# Patient Record
Sex: MALE | Race: WHITE | NOT HISPANIC OR LATINO | URBAN - METROPOLITAN AREA
[De-identification: names, ages, dates, MRNs, and addresses within clinical notes are randomized per-mention and may not be internally consistent; named-entity substitution may affect disease eponyms.]

---

## 2023-01-31 ENCOUNTER — INPATIENT (INPATIENT)
Facility: HOSPITAL | Age: 68
LOS: 3 days | Discharge: HOME | DRG: 125 | End: 2023-02-04
Attending: PSYCHIATRY & NEUROLOGY | Admitting: PSYCHIATRY & NEUROLOGY
Payer: MEDICAID

## 2023-01-31 VITALS
DIASTOLIC BLOOD PRESSURE: 74 MMHG | SYSTOLIC BLOOD PRESSURE: 109 MMHG | WEIGHT: 111.99 LBS | RESPIRATION RATE: 16 BRPM | TEMPERATURE: 98 F | OXYGEN SATURATION: 100 % | HEART RATE: 99 BPM | HEIGHT: 61.42 IN

## 2023-01-31 LAB
ALBUMIN SERPL ELPH-MCNC: 3.8 G/DL — SIGNIFICANT CHANGE UP (ref 3.3–5)
ALP SERPL-CCNC: 60 U/L — SIGNIFICANT CHANGE UP (ref 40–120)
ALT FLD-CCNC: 30 U/L — SIGNIFICANT CHANGE UP (ref 10–45)
ANION GAP SERPL CALC-SCNC: 11 MMOL/L — SIGNIFICANT CHANGE UP (ref 5–17)
APTT BLD: 29.6 SEC — SIGNIFICANT CHANGE UP (ref 27.5–35.5)
AST SERPL-CCNC: 25 U/L — SIGNIFICANT CHANGE UP (ref 10–40)
BASOPHILS # BLD AUTO: 0.08 K/UL — SIGNIFICANT CHANGE UP (ref 0–0.2)
BASOPHILS NFR BLD AUTO: 1 % — SIGNIFICANT CHANGE UP (ref 0–2)
BILIRUB SERPL-MCNC: 0.5 MG/DL — SIGNIFICANT CHANGE UP (ref 0.2–1.2)
BUN SERPL-MCNC: 18 MG/DL — SIGNIFICANT CHANGE UP (ref 7–23)
CALCIUM SERPL-MCNC: 9.6 MG/DL — SIGNIFICANT CHANGE UP (ref 8.4–10.5)
CHLORIDE SERPL-SCNC: 100 MMOL/L — SIGNIFICANT CHANGE UP (ref 96–108)
CO2 SERPL-SCNC: 25 MMOL/L — SIGNIFICANT CHANGE UP (ref 22–31)
CREAT SERPL-MCNC: 1.18 MG/DL — SIGNIFICANT CHANGE UP (ref 0.5–1.3)
EGFR: 68 ML/MIN/1.73M2 — SIGNIFICANT CHANGE UP
EOSINOPHIL # BLD AUTO: 0.19 K/UL — SIGNIFICANT CHANGE UP (ref 0–0.5)
EOSINOPHIL NFR BLD AUTO: 2.5 % — SIGNIFICANT CHANGE UP (ref 0–6)
GLUCOSE SERPL-MCNC: 90 MG/DL — SIGNIFICANT CHANGE UP (ref 70–99)
HCT VFR BLD CALC: 35.1 % — LOW (ref 39–50)
HGB BLD-MCNC: 11.4 G/DL — LOW (ref 13–17)
IMM GRANULOCYTES NFR BLD AUTO: 0.6 % — SIGNIFICANT CHANGE UP (ref 0–0.9)
INR BLD: 1.07 — SIGNIFICANT CHANGE UP (ref 0.88–1.16)
LYMPHOCYTES # BLD AUTO: 1 K/UL — SIGNIFICANT CHANGE UP (ref 1–3.3)
LYMPHOCYTES # BLD AUTO: 13 % — SIGNIFICANT CHANGE UP (ref 13–44)
MCHC RBC-ENTMCNC: 32.1 PG — SIGNIFICANT CHANGE UP (ref 27–34)
MCHC RBC-ENTMCNC: 32.5 GM/DL — SIGNIFICANT CHANGE UP (ref 32–36)
MCV RBC AUTO: 98.9 FL — SIGNIFICANT CHANGE UP (ref 80–100)
MONOCYTES # BLD AUTO: 0.7 K/UL — SIGNIFICANT CHANGE UP (ref 0–0.9)
MONOCYTES NFR BLD AUTO: 9.1 % — SIGNIFICANT CHANGE UP (ref 2–14)
NEUTROPHILS # BLD AUTO: 5.69 K/UL — SIGNIFICANT CHANGE UP (ref 1.8–7.4)
NEUTROPHILS NFR BLD AUTO: 73.8 % — SIGNIFICANT CHANGE UP (ref 43–77)
NRBC # BLD: 0 /100 WBCS — SIGNIFICANT CHANGE UP (ref 0–0)
PLATELET # BLD AUTO: 264 K/UL — SIGNIFICANT CHANGE UP (ref 150–400)
POTASSIUM SERPL-MCNC: 4.4 MMOL/L — SIGNIFICANT CHANGE UP (ref 3.5–5.3)
POTASSIUM SERPL-SCNC: 4.4 MMOL/L — SIGNIFICANT CHANGE UP (ref 3.5–5.3)
PROT SERPL-MCNC: 6.5 G/DL — SIGNIFICANT CHANGE UP (ref 6–8.3)
PROTHROM AB SERPL-ACNC: 12.8 SEC — SIGNIFICANT CHANGE UP (ref 10.5–13.4)
RBC # BLD: 3.55 M/UL — LOW (ref 4.2–5.8)
RBC # FLD: 12.9 % — SIGNIFICANT CHANGE UP (ref 10.3–14.5)
SARS-COV-2 RNA SPEC QL NAA+PROBE: SIGNIFICANT CHANGE UP
SODIUM SERPL-SCNC: 136 MMOL/L — SIGNIFICANT CHANGE UP (ref 135–145)
TROPONIN T SERPL-MCNC: <0.01 NG/ML — SIGNIFICANT CHANGE UP (ref 0–0.01)
WBC # BLD: 7.71 K/UL — SIGNIFICANT CHANGE UP (ref 3.8–10.5)
WBC # FLD AUTO: 7.71 K/UL — SIGNIFICANT CHANGE UP (ref 3.8–10.5)

## 2023-01-31 PROCEDURE — 0042T: CPT | Mod: MA

## 2023-01-31 PROCEDURE — 71045 X-RAY EXAM CHEST 1 VIEW: CPT | Mod: 26

## 2023-01-31 PROCEDURE — 70498 CT ANGIOGRAPHY NECK: CPT | Mod: 26,MA

## 2023-01-31 PROCEDURE — 70496 CT ANGIOGRAPHY HEAD: CPT | Mod: 26,MA

## 2023-01-31 PROCEDURE — 99291 CRITICAL CARE FIRST HOUR: CPT

## 2023-01-31 RX ORDER — CLOPIDOGREL BISULFATE 75 MG/1
75 TABLET, FILM COATED ORAL DAILY
Refills: 0 | Status: DISCONTINUED | OUTPATIENT
Start: 2023-02-01 | End: 2023-02-03

## 2023-01-31 RX ORDER — CLOPIDOGREL BISULFATE 75 MG/1
300 TABLET, FILM COATED ORAL ONCE
Refills: 0 | Status: COMPLETED | OUTPATIENT
Start: 2023-01-31 | End: 2023-01-31

## 2023-01-31 RX ORDER — ENOXAPARIN SODIUM 100 MG/ML
40 INJECTION SUBCUTANEOUS EVERY 24 HOURS
Refills: 0 | Status: DISCONTINUED | OUTPATIENT
Start: 2023-01-31 | End: 2023-02-04

## 2023-01-31 RX ORDER — ASPIRIN/CALCIUM CARB/MAGNESIUM 324 MG
325 TABLET ORAL ONCE
Refills: 0 | Status: COMPLETED | OUTPATIENT
Start: 2023-01-31 | End: 2023-01-31

## 2023-01-31 RX ORDER — ATORVASTATIN CALCIUM 80 MG/1
80 TABLET, FILM COATED ORAL AT BEDTIME
Refills: 0 | Status: DISCONTINUED | OUTPATIENT
Start: 2023-01-31 | End: 2023-02-03

## 2023-01-31 RX ORDER — ASPIRIN/CALCIUM CARB/MAGNESIUM 324 MG
81 TABLET ORAL DAILY
Refills: 0 | Status: DISCONTINUED | OUTPATIENT
Start: 2023-02-01 | End: 2023-02-03

## 2023-01-31 RX ADMIN — Medication 325 MILLIGRAM(S): at 20:06

## 2023-01-31 RX ADMIN — ATORVASTATIN CALCIUM 80 MILLIGRAM(S): 80 TABLET, FILM COATED ORAL at 21:40

## 2023-01-31 RX ADMIN — CLOPIDOGREL BISULFATE 300 MILLIGRAM(S): 75 TABLET, FILM COATED ORAL at 20:06

## 2023-01-31 RX ADMIN — ENOXAPARIN SODIUM 40 MILLIGRAM(S): 100 INJECTION SUBCUTANEOUS at 21:40

## 2023-01-31 NOTE — ED PROVIDER NOTE - PHYSICAL EXAMINATION
CONST: nontoxic NAD speaking in full sentences  HEAD: atraumatic  EYES: conjunctivae clear  ENT: +trach to air w/ thick discharge, mmm  NECK: supple/FROM  CARD: rrr no murmurs  CHEST: ctab no r/r/w  ABD: soft, nd, nttp, no rebound/guarding  EXT: FROM, symmetric distal pulses intact  SKIN: warm, dry, no rash, no pedal edema/ttp/rash, cap refill <2sec  NEURO: a+ox3, complete OD vision loss, otherwise CN II-XII intact, neg pronator, 5/5 strength x4, gross sensation intact x4

## 2023-01-31 NOTE — H&P ADULT - NSHPLABSRESULTS_GEN_ALL_CORE
Fingerstick Blood Glucose: CAPILLARY BLOOD GLUCOSE  84 (31 Jan 2023 18:53)      POCT Blood Glucose.: 84 mg/dL (31 Jan 2023 18:32)    LABS:                        11.4   7.71  )-----------( 264      ( 31 Jan 2023 19:25 )             35.1     01-31    136  |  100  |  18  ----------------------------<  90  4.4   |  25  |  1.18    Ca    9.6      31 Jan 2023 19:25    TPro  6.5  /  Alb  3.8  /  TBili  0.5  /  DBili  x   /  AST  25  /  ALT  30  /  AlkPhos  60  01-31    PT/INR - ( 31 Jan 2023 19:25 )   PT: 12.8 sec;   INR: 1.07          PTT - ( 31 Jan 2023 19:25 )  PTT:29.6 sec  CARDIAC MARKERS ( 31 Jan 2023 19:25 )  x     / <0.01 ng/mL / x     / x     / x              RADIOLOGY & ADDITIONAL STUDIES:    < from: CT Brain Stroke Protocol (01.31.23 @ 19:07) >    Impression: Minimal microvascular disease. No acute intracranial injury.      < from: CT Brain Perfusion Maps Stroke (01.31.23 @ 19:08) >    IMPRESSION:  No evidence of CT perfusion deficit.      < from: CT Angio Brain Stroke Protocol  w/ IV Cont (01.31.23 @ 19:09) >    Impression: Normal CTA of the intracranial circulation.       < from: CT Angio Neck Stroke Protocol w/ IV Cont (01.31.23 @ 19:11) >    Impression: Normal CTA of the extracranial circulation. No evidence of   carotid stenosis.

## 2023-01-31 NOTE — ED ADULT TRIAGE NOTE - CHIEF COMPLAINT QUOTE
as per PT "I felt dizzy at 1am, passed out and cannot see out of my right eye." tracheostomy to air. denies SOB and CP. as per PT "I felt dizzy at 1am, passed out and cannot see out of my right eye since." tracheostomy to air. denies SOB and CP.

## 2023-01-31 NOTE — ED PROVIDER NOTE - WR INTERPRETATION 1
+trach. +mild increased bl interstitial opacities. no acute focal consolidation vs ptx vs pulm edema.

## 2023-01-31 NOTE — H&P ADULT - ASSESSMENT
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 Ocular US done in ED, OU: no lens displacement, no pvd, no vitreous hemorrhage, no retinal detachment. SBP 130s. Initial imaging unremarkable. Loaded with DAPT. NIHSS 3. OOW for  tenecteplase. No LVO on CTA. Optho consulted in ED for CRAO. Admitted for further stroke workup.     Neuro  #CVA workup  - s/p dapt load  - continue aspirin 81mg and plavix 75mg daily  - continue atorvastatin 80mg daily  - q4hr stroke neuro checks and vitals  - obtain MRI Brain without contrast  - Stroke Code HCT Results: neg  - Stroke Code CTA Results: neg  - Stroke education  - f/u optho consult (ED provider called and left message)    Cards  #HTN  - permissive hypertension, Goal -180  - hold home blood pressure medication for now  - obtain TTE with bubble  - Stroke Code EKG Results:   · EKG Date/Time: 31-Jan-2023 18:34  · Rate: 92  · Axis: Normal  · GA: 140  · QRS: 74  · QTC: 437  · ST/T Wave: no st/t changes    #HLD  - high dose statin as above in CVA  - LDL results: pending    Pulm  - call provider if SPO2 < 94%    GI  #Nutrition/Fluids/Electrolytes   - replete K<4 and Mg <2  - Diet: reg once passed dysphagia  - IVF: none indicated at this time    Renal  - trend BMP    Infectious Disease  - Stroke Code CXR results:  No evidence of acute cardiopulmonary disease. Incidentally seen is a small opacification with some retraction in the right upper lobe, likely mucoid impaction. Further evaluation on a nonemergent basis with chest CT may be pursued if clinically warranted.    Endocrine  - A1C results: pending  - TSH results: pending    DVT Prophylaxis  - lovenox sq for DVT prophylaxis   - SCDs for DVT prophylaxis       Dispo: admit to stroke tele     Discussed daily hospital plans and goals with patient at bedside.    Discussed with stroke fellow Dr. Mejias who discussed with neurology attending Dr. Gusman

## 2023-01-31 NOTE — ED PROVIDER NOTE - PROGRESS NOTE DETAILS
ED ocular us: OU: no lens displacement, no pvd, no vitreous hemorrhage, no retinal detachment. stroke reccs for [id number], ophtho consult, admission to stroke/tele under dr goodwin. stroke reccs for [id number], ophtho consult, admission to stroke/tele under dr goodwin. admitting team to fu on pending labs.

## 2023-01-31 NOTE — ED PROVIDER NOTE - OBJECTIVE STATEMENT
66M Ukrainian-speaking, former smoker (42py), throat ca s/p resection/trach to air (2017, [place]), htn not on Rx, has not seen pcp/onc >6y, now c/o acute vertigo-like sx that woke him from sleep last night lasting x30min that then recurred this AM at 1:30a but also w/ complete OD vision loss. no fever/chills, no ha, no neck pain/stiffness, no eye pain/discharge, no photophobia, no prior eye surgery, no uri/cough, no cp/sob, no abd pain/n/v, no diarrhea, no rash, no trauma, no etoh-dpt/ivdu, no phx acs/mi vs dvt/pe vs tia/cva, no antiplatelet/AC.

## 2023-01-31 NOTE — H&P ADULT - HISTORY OF PRESENT ILLNESS
**STROKE HPI***    HPI: 67y Estonian speaking male, former heavy tobacco smoker (2PPD, 42py), 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 Denies any other focal neurological sx. Ocular US done in ED, OU: no lens displacement, no pvd, no vitreous hemorrhage, no retinal detachment. SBP 130s. Initial imaging unremarkable. Loaded with DAPT.     PAST MEDICAL & SURGICAL HISTORY:      FAMILY HISTORY:      T(C): 36.6 (01-31-23 @ 19:45), Max: 36.6 (01-31-23 @ 19:45)  HR: 84 (01-31-23 @ 19:45) (76 - 99)  BP: 132/64 (01-31-23 @ 19:45) (109/74 - 133/74)  RR: 16 (01-31-23 @ 19:45) (16 - 20)  SpO2: 100% (01-31-23 @ 19:45) (100% - 100%)    MEDICATION RECONCILIATION   MEDICATIONS  (STANDING):  atorvastatin 80 milliGRAM(s) Oral at bedtime  enoxaparin Injectable 40 milliGRAM(s) SubCutaneous every 24 hours    MEDICATIONS  (PRN):    Allergies    No Known Allergies    Intolerances      Vital Signs Last 24 Hrs  T(C): 36.6 (31 Jan 2023 19:45), Max: 36.6 (31 Jan 2023 19:45)  T(F): 97.9 (31 Jan 2023 19:45), Max: 97.9 (31 Jan 2023 19:45)  HR: 84 (31 Jan 2023 19:45) (76 - 99)  BP: 132/64 (31 Jan 2023 19:45) (109/74 - 133/74)  BP(mean): --  RR: 16 (31 Jan 2023 19:45) (16 - 20)  SpO2: 100% (31 Jan 2023 19:45) (100% - 100%)    Parameters below as of 31 Jan 2023 19:45  Patient On (Oxygen Delivery Method): room air        **STROKE HPI***     ID # [id number]     HPI: 67y Japanese speaking male, former heavy tobacco smoker (2PPD, 42py), 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 Denies any other focal neurological sx. Ocular US done in ED, OU: no lens displacement, no pvd, no vitreous hemorrhage, no retinal detachment. SBP 130s. Initial imaging unremarkable. Loaded with DAPT.     PAST MEDICAL & SURGICAL HISTORY:      FAMILY HISTORY:      T(C): 36.6 (01-31-23 @ 19:45), Max: 36.6 (01-31-23 @ 19:45)  HR: 84 (01-31-23 @ 19:45) (76 - 99)  BP: 132/64 (01-31-23 @ 19:45) (109/74 - 133/74)  RR: 16 (01-31-23 @ 19:45) (16 - 20)  SpO2: 100% (01-31-23 @ 19:45) (100% - 100%)    MEDICATION RECONCILIATION   MEDICATIONS  (STANDING):  atorvastatin 80 milliGRAM(s) Oral at bedtime  enoxaparin Injectable 40 milliGRAM(s) SubCutaneous every 24 hours    MEDICATIONS  (PRN):    Allergies    No Known Allergies    Intolerances      Vital Signs Last 24 Hrs  T(C): 36.6 (31 Jan 2023 19:45), Max: 36.6 (31 Jan 2023 19:45)  T(F): 97.9 (31 Jan 2023 19:45), Max: 97.9 (31 Jan 2023 19:45)  HR: 84 (31 Jan 2023 19:45) (76 - 99)  BP: 132/64 (31 Jan 2023 19:45) (109/74 - 133/74)  BP(mean): --  RR: 16 (31 Jan 2023 19:45) (16 - 20)  SpO2: 100% (31 Jan 2023 19:45) (100% - 100%)    Parameters below as of 31 Jan 2023 19:45  Patient On (Oxygen Delivery Method): room air

## 2023-01-31 NOTE — ED ADULT NURSE NOTE - OBJECTIVE STATEMENT
67 M/ on trach to air, bibems c/o as per he passed out this morning and woke up with vision changes and last normal at 1am . Stroke code activated .

## 2023-01-31 NOTE — H&P ADULT - NSHPPHYSICALEXAM_GEN_ALL_CORE
Neurologic:  -Mental status: Awake, alert, oriented to person, age, and month. Speech is fluent with intact naming, repetition, and comprehension, slight dysarthria though speaking with trach in place. Follows commands.   -Cranial nerves:   II: No vision in right eye. Left eye full to confrontation of all visual fields  III, IV, VI: Extraocular movements are intact without nystagmus. Pupils equally round and reactive to light  V:  Facial sensation V1-V3 equal and intact   VII: Face appears symmetric  Motor: Normal bulk and tone. No pronator drift. Strength bilateral upper extremity 5/5 with questionable right finger curling/pulling on oxygen line, bilateral lower extremities 5/5.  Sensation: Intact to light touch bilaterally. No neglect or extinction on double simultaneous testing.  Coordination: No dysmetria on finger-to-nose    NIHSS: 3  ASPECT: 10

## 2023-01-31 NOTE — ED ADULT NURSE NOTE - CHIEF COMPLAINT QUOTE
as per PT "I felt dizzy at 1am, passed out and cannot see out of my right eye since." tracheostomy to air. denies SOB and CP.

## 2023-01-31 NOTE — ED PROVIDER NOTE - CLINICAL SUMMARY MEDICAL DECISION MAKING FREE TEXT BOX
OB 66M Kyrgyz-speaking, former smoker (42py), throat ca s/p resection/trach to air (2017, [place]), htn not on Rx, has not seen pcp/onc >6y, now c/o acute vertigo-like sx that woke him from sleep last night lasting x30min that then recurred this AM at 1:30a but also w/ complete OD vision loss. STROKE CODE activated by provider as within time frame with concern for posterior stroke. Neurology called.

## 2023-02-01 LAB
A1C WITH ESTIMATED AVERAGE GLUCOSE RESULT: 5 % — SIGNIFICANT CHANGE UP (ref 4–5.6)
ANION GAP SERPL CALC-SCNC: 10 MMOL/L — SIGNIFICANT CHANGE UP (ref 5–17)
BUN SERPL-MCNC: 22 MG/DL — SIGNIFICANT CHANGE UP (ref 7–23)
CALCIUM SERPL-MCNC: 9.2 MG/DL — SIGNIFICANT CHANGE UP (ref 8.4–10.5)
CHLORIDE SERPL-SCNC: 103 MMOL/L — SIGNIFICANT CHANGE UP (ref 96–108)
CHOLEST SERPL-MCNC: 137 MG/DL — SIGNIFICANT CHANGE UP
CO2 SERPL-SCNC: 25 MMOL/L — SIGNIFICANT CHANGE UP (ref 22–31)
CREAT SERPL-MCNC: 1.39 MG/DL — HIGH (ref 0.5–1.3)
EGFR: 56 ML/MIN/1.73M2 — LOW
ERYTHROCYTE [SEDIMENTATION RATE] IN BLOOD: 10 MM/HR — SIGNIFICANT CHANGE UP
ESTIMATED AVERAGE GLUCOSE: 97 MG/DL — SIGNIFICANT CHANGE UP (ref 68–114)
GLUCOSE SERPL-MCNC: 94 MG/DL — SIGNIFICANT CHANGE UP (ref 70–99)
HCT VFR BLD CALC: 33.1 % — LOW (ref 39–50)
HCV AB S/CO SERPL IA: 0.04 S/CO — SIGNIFICANT CHANGE UP
HCV AB SERPL-IMP: SIGNIFICANT CHANGE UP
HDLC SERPL-MCNC: 42 MG/DL — SIGNIFICANT CHANGE UP
HGB BLD-MCNC: 10.8 G/DL — LOW (ref 13–17)
LIPID PNL WITH DIRECT LDL SERPL: 77 MG/DL — SIGNIFICANT CHANGE UP
MAGNESIUM SERPL-MCNC: 1.8 MG/DL — SIGNIFICANT CHANGE UP (ref 1.6–2.6)
MCHC RBC-ENTMCNC: 32 PG — SIGNIFICANT CHANGE UP (ref 27–34)
MCHC RBC-ENTMCNC: 32.6 GM/DL — SIGNIFICANT CHANGE UP (ref 32–36)
MCV RBC AUTO: 97.9 FL — SIGNIFICANT CHANGE UP (ref 80–100)
NON HDL CHOLESTEROL: 95 MG/DL — SIGNIFICANT CHANGE UP
NRBC # BLD: 0 /100 WBCS — SIGNIFICANT CHANGE UP (ref 0–0)
PHOSPHATE SERPL-MCNC: 5.7 MG/DL — HIGH (ref 2.5–4.5)
PLATELET # BLD AUTO: 240 K/UL — SIGNIFICANT CHANGE UP (ref 150–400)
POTASSIUM SERPL-MCNC: 4 MMOL/L — SIGNIFICANT CHANGE UP (ref 3.5–5.3)
POTASSIUM SERPL-SCNC: 4 MMOL/L — SIGNIFICANT CHANGE UP (ref 3.5–5.3)
RBC # BLD: 3.38 M/UL — LOW (ref 4.2–5.8)
RBC # FLD: 12.9 % — SIGNIFICANT CHANGE UP (ref 10.3–14.5)
SODIUM SERPL-SCNC: 138 MMOL/L — SIGNIFICANT CHANGE UP (ref 135–145)
TRIGL SERPL-MCNC: 91 MG/DL — SIGNIFICANT CHANGE UP
TSH SERPL-MCNC: 3.97 UIU/ML — SIGNIFICANT CHANGE UP (ref 0.27–4.2)
WBC # BLD: 6.45 K/UL — SIGNIFICANT CHANGE UP (ref 3.8–10.5)
WBC # FLD AUTO: 6.45 K/UL — SIGNIFICANT CHANGE UP (ref 3.8–10.5)

## 2023-02-01 PROCEDURE — 70543 MRI ORBT/FAC/NCK W/O &W/DYE: CPT | Mod: 26

## 2023-02-01 PROCEDURE — 93306 TTE W/DOPPLER COMPLETE: CPT | Mod: 26

## 2023-02-01 RX ORDER — INFLUENZA VIRUS VACCINE 15; 15; 15; 15 UG/.5ML; UG/.5ML; UG/.5ML; UG/.5ML
0.7 SUSPENSION INTRAMUSCULAR ONCE
Refills: 0 | Status: DISCONTINUED | OUTPATIENT
Start: 2023-02-01 | End: 2023-02-04

## 2023-02-01 RX ADMIN — ENOXAPARIN SODIUM 40 MILLIGRAM(S): 100 INJECTION SUBCUTANEOUS at 23:30

## 2023-02-01 RX ADMIN — ATORVASTATIN CALCIUM 80 MILLIGRAM(S): 80 TABLET, FILM COATED ORAL at 23:30

## 2023-02-01 RX ADMIN — CLOPIDOGREL BISULFATE 75 MILLIGRAM(S): 75 TABLET, FILM COATED ORAL at 12:28

## 2023-02-01 RX ADMIN — Medication 81 MILLIGRAM(S): at 12:28

## 2023-02-01 NOTE — PHYSICAL THERAPY INITIAL EVALUATION ADULT - IMPAIRMENTS CONTRIBUTING TO GAIT DEVIATIONS, PT EVAL
no pt educated on visual compensation strategies such as turning head to scan environment 2/2 impaired R vision

## 2023-02-01 NOTE — PHYSICAL THERAPY INITIAL EVALUATION ADULT - GENERAL OBSERVATIONS, REHAB EVAL
PT IE completed. Chart reviewed. Pt received seated EOB, NAD, +IV heplock, +trach. LINO Turpin cleared pt for PT. PT IE completed. Chart reviewed. MRS 1. Pt received seated EOB, NAD, +IV heplock, +trach. LINO Turpin cleared pt for PT.

## 2023-02-01 NOTE — PATIENT PROFILE ADULT - FALL HARM RISK - UNIVERSAL INTERVENTIONS
Bed in lowest position, wheels locked, appropriate side rails in place/Call bell, personal items and telephone in reach/Instruct patient to call for assistance before getting out of bed or chair/Non-slip footwear when patient is out of bed/[place] to call system/Physically safe environment - no spills, clutter or unnecessary equipment/Purposeful Proactive Rounding/Room/bathroom lighting operational, light cord in reach

## 2023-02-01 NOTE — PHYSICAL THERAPY INITIAL EVALUATION ADULT - ADDITIONAL COMMENTS
Pt reports living in apartment with 3 FOS to enter, with 3 sisters. Reports being independent with daily activities without use of AD.

## 2023-02-01 NOTE — OCCUPATIONAL THERAPY INITIAL EVALUATION ADULT - VISUAL ACUITY
not tested No glasses, throughout session pt reports blurry vision with decrease R vision/not tested

## 2023-02-01 NOTE — OCCUPATIONAL THERAPY INITIAL EVALUATION ADULT - VISUAL ASSESSMENT: VISUAL FIELD CUTS
none Pt p/w R peripheral vision (superior quadrants with difficulties to maintain R eye opening during assessment)/none

## 2023-02-01 NOTE — OCCUPATIONAL THERAPY INITIAL EVALUATION ADULT - DIAGNOSIS, OT EVAL
Pt p/w new onset of L hearing and R vision loss, however presents at baseline (IND) for ADLs and functional mobility.

## 2023-02-01 NOTE — OCCUPATIONAL THERAPY INITIAL EVALUATION ADULT - LIVES WITH, PROFILE
Pt lives with his sisters in apt with 3 floor to enter. Pt at baseline is ind for ADLs and functional mobility. No DME needed./other relative

## 2023-02-01 NOTE — PROGRESS NOTE ADULT - ASSESSMENT
67y Israeli speaking male, former heavy tobacco smoker (2PPD, 42py), throat ca s/p resection/trach to air (2017), ?HTN, no meds, coming in with a couple episodes of intermittent dizziness, worse with head movement and also complete  right eye vision loss since 1:30 morning of 1/31. Ocular US done in ED, OU: no lens displacement, no pvd, no vitreous hemorrhage, no retinal detachment. SBP 130s. Initial imaging unremarkable. Loaded with DAPT. NIHSS 3. OOW for  tenecteplase. No LVO on CTA. Optho consulted in ED for CRAO. Admitted for further stroke workup.     Neuro  #CVA workup  - s/p dapt load  - continue aspirin 81mg and plavix 75mg daily  - continue atorvastatin 80mg daily  - q4hr stroke neuro checks and vitals  - obtain MRI Brain without contrast  - Stroke Code HCT Results: neg  - Stroke Code CTA Results: neg  - Stroke education  - f/u optho consult    Cards  #HTN  - blood pressure 99/77 this morning, will monitor  - permissive hypertension, Goal -180  - hold home blood pressure medication for now  - obtain TTE with bubble  - Stroke Code EKG Results:   · EKG Date/Time: 31-Jan-2023 18:34  · Rate: 92  · Axis: Normal  · IA: 140  · QRS: 74  · QTC: 437  · ST/T Wave: no st/t changes    #HLD  - high dose statin as above in CVA  - LDL results: 77    Pulm  - call provider if SPO2 < 94%    GI  #Nutrition/Fluids/Electrolytes   - replete K<4 and Mg <2  - Diet: reg once passed dysphagia  - IVF: none indicated at this time    Renal  - trend BMP    Infectious Disease  - Stroke Code CXR results:  No evidence of acute cardiopulmonary disease. Incidentally seen is a small opacification with some retraction in the right upper lobe, likely mucoid impaction. Further evaluation on a nonemergent basis with chest CT may be pursued if clinically warranted.    Endocrine  - A1C results: 5.0  - TSH results: 3.970    DVT Prophylaxis  - lovenox sq for DVT prophylaxis   - SCDs for DVT prophylaxis       Dispo: admit to stroke tele     Discussed daily hospital plans and goals with patient at bedside.    Discussed with stroke fellow Dr. Mejias who discussed with neurology attending Dr. Gusman             67y Australian speaking male, former heavy tobacco smoker (2PPD, 42py), throat ca s/p resection/trach to air (2017), ?HTN, no meds, coming in with a couple episodes of intermittent dizziness, worse with head movement and also complete  right eye vision loss since 1:30 morning of 1/31. Ocular US done in ED, OU: no lens displacement, no pvd, no vitreous hemorrhage, no retinal detachment. SBP 130s. Initial imaging unremarkable. Loaded with DAPT. NIHSS 3. OOW for  tenecteplase. No LVO on CTA. Optho consulted in ED for CRAO. Admitted for further stroke workup.     Neuro  #CVA workup  - s/p dapt load  - continue aspirin 81mg and plavix 75mg daily  - continue atorvastatin 80mg daily  - q4hr stroke neuro checks and vitals  - obtain MRI Brain without contrast  - Stroke Code HCT Results: neg  - Stroke Code CTA Results: neg  - Stroke education  - f/u optho consult  - ESR pending    Cards  #HTN  - blood pressure 99/77 this morning, will monitor  - permissive hypertension, Goal -180  - hold home blood pressure medication for now  - obtain TTE with bubble  - Stroke Code EKG Results:   · EKG Date/Time: 31-Jan-2023 18:34  · Rate: 92  · Axis: Normal  · TX: 140  · QRS: 74  · QTC: 437  · ST/T Wave: no st/t changes    #HLD  - high dose statin as above in CVA  - LDL results: 77    Pulm  - call provider if SPO2 < 94%    GI  #Nutrition/Fluids/Electrolytes   - replete K<4 and Mg <2  - Diet: reg once passed dysphagia  - IVF: none indicated at this time    Renal  - trend BMP    Infectious Disease  - Stroke Code CXR results:  No evidence of acute cardiopulmonary disease. Incidentally seen is a small opacification with some retraction in the right upper lobe, likely mucoid impaction. Further evaluation on a nonemergent basis with chest CT may be pursued if clinically warranted.    Endocrine  - A1C results: 5.0  - TSH results: 3.970    DVT Prophylaxis  - lovenox sq for DVT prophylaxis   - SCDs for DVT prophylaxis       Dispo: admit to stroke tele, PT/OT/Social work/Speech eval pending     Discussed daily hospital plans and goals with patient at bedside.    Discussed with stroke fellow Dr. Mejias who discussed with neurology attending Dr. Gusman             67y Salvadorean speaking male, former heavy tobacco smoker (2PPD, 42py), throat ca s/p resection/trach to air (2017), ?HTN, no meds, coming in with a couple episodes of intermittent dizziness, worse with head movement and also complete  right eye vision loss since 1:30 morning of 1/31. Ocular US done in ED, OU: no lens displacement, no pvd, no vitreous hemorrhage, no retinal detachment. SBP 130s. Initial imaging unremarkable. Loaded with DAPT. NIHSS 3. OOW for  tenecteplase. No LVO on CTA. Optho consulted in ED for CRAO. Admitted for further stroke workup.     Neuro  #CVA workup  - s/p dapt load  - continue aspirin 81mg and plavix 75mg daily  - continue atorvastatin 80mg daily  - q4hr stroke neuro checks and vitals  - obtain MRI Brain without contrast  - Stroke Code HCT Results: neg  - Stroke Code CTA Results: neg  - Stroke education  - ESR 10  - Ophthalmology consult: MRI head and orbits with and without contrast; outpatient follow-up with his ophthalmologist or with [facility] Department of Ophthalmology   [facility] [address] [address]      Cards  #HTN  - blood pressure 99/77 this morning, will monitor  - permissive hypertension, Goal -180  - hold home blood pressure medication for now  - TTE with bubble - normal  - Stroke Code EKG Results:   · EKG Date/Time: 31-Jan-2023 18:34  · Rate: 92  · Axis: Normal  · CT: 140  · QRS: 74  · QTC: 437  · ST/T Wave: no st/t changes    #HLD  - high dose statin as above in CVA  - LDL results: 77    Pulm  - call provider if SPO2 < 94%    GI  #Nutrition/Fluids/Electrolytes   - replete K<4 and Mg <2  - Diet: reg once passed dysphagia  - IVF: none indicated at this time    Renal  - trend BMP    Infectious Disease  - Stroke Code CXR results:  No evidence of acute cardiopulmonary disease. Incidentally seen is a small opacification with some retraction in the right upper lobe, likely mucoid impaction. Further evaluation on a nonemergent basis with chest CT may be pursued if clinically warranted.    Endocrine  - A1C results: 5.0  - TSH results: 3.970    DVT Prophylaxis  - lovenox sq for DVT prophylaxis   - SCDs for DVT prophylaxis       Dispo: admit to stroke tele, PT/OT/Social work/Speech eval pending; OT recommends outpatient OT/visual therapy     Discussed daily hospital plans and goals with patient at bedside.    Discussed with stroke fellow Dr. Mejias who discussed with neurology attending Dr. Gusmna             67y Citizen of [place] speaking male, former heavy tobacco smoker (2PPD, 42py), throat ca s/p resection/trach to air (2017), ?HTN, no meds, coming in with a couple episodes of intermittent dizziness, worse with head movement and also complete  right eye vision loss since 1:30 morning of 1/31. Ocular US done in ED, OU: no lens displacement, no pvd, no vitreous hemorrhage, no retinal detachment. SBP 130s. Initial imaging unremarkable. Loaded with DAPT. NIHSS 3. OOW for  tenecteplase. No LVO on CTA. Optho consulted in ED for CRAO. Admitted for further stroke workup.     Neuro  #CVA workup  - s/p dapt load  - continue aspirin 81mg and plavix 75mg daily  - continue atorvastatin 80mg daily  - q4hr stroke neuro checks and vitals  - obtain MRI Brain without contrast  - Stroke Code HCT Results: neg  - Stroke Code CTA Results: neg  - Stroke education  - ESR 10  - Ophthalmology consult: MRI head and orbits with and without contrast; outpatient follow-up with his ophthalmologist or with [facility] Department of Ophthalmology   [facility] [address] [address]      Cards  #HTN  - blood pressure 99/77 this morning, will monitor  - permissive hypertension, Goal -180  - hold home blood pressure medication for now  - TTE with bubble - normal  - Stroke Code EKG Results:   · EKG Date/Time: 31-Jan-2023 18:34  · Rate: 92  · Axis: Normal  · UT: 140  · QRS: 74  · QTC: 437  · ST/T Wave: no st/t changes    #HLD  - high dose statin as above in CVA  - LDL results: 77    Pulm  - call provider if SPO2 < 94%  #Tracheostomy  - throat cancer resection 7 years ago in [facility]; no further details known by patient and hospital has since closed; he will ask sister for more information    GI  #Nutrition/Fluids/Electrolytes   - replete K<4 and Mg <2  - Diet: reg once passed dysphagia  - IVF: none indicated at this time    Renal  - trend BMP    Infectious Disease  - Stroke Code CXR results:  No evidence of acute cardiopulmonary disease. Incidentally seen is a small opacification with some retraction in the right upper lobe, likely mucoid impaction. Further evaluation on a nonemergent basis with chest CT may be pursued if clinically warranted.    Endocrine  - A1C results: 5.0  - TSH results: 3.970    DVT Prophylaxis  - lovenox sq for DVT prophylaxis   - SCDs for DVT prophylaxis       Dispo: admit to stroke tele, PT/OT/Social work/Speech eval pending; OT recommends outpatient OT/visual therapy     Discussed daily hospital plans and goals with patient at bedside.    Discussed with stroke fellow Dr. Mejias who discussed with neurology attending Dr. Gusman

## 2023-02-01 NOTE — PHYSICAL THERAPY INITIAL EVALUATION ADULT - MANUAL MUSCLE TESTING RESULTS, REHAB EVAL
MMT performed: (L)UE and (L)LE 5/5 for all major pivots; (R)shoulder flexion 5/5, (R)elbow flexion 5/5, (R)elbow extension 5/5, (R)wrist extension N/T, (R)wrist flexion N/T; (R)hip flexion 5/5, (R)knee extension 5/5, (R)knee flexion 5/5, (R)ankle DF 5/5, (R)ankle PF 5/5

## 2023-02-01 NOTE — OCCUPATIONAL THERAPY INITIAL EVALUATION ADULT - PERTINENT HX OF CURRENT PROBLEM, REHAB EVAL
67y Irish speaking male, former heavy tobacco smoker (2PPD, 42py), 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 Denies any other focal neurological sx. Ocular US done in ED, OU: no lens displacement, no pvd, no vitreous hemorrhage, no retinal detachment. SBP 130s. Initial imaging unremarkable. Loaded with DAPT.

## 2023-02-01 NOTE — PROGRESS NOTE ADULT - SUBJECTIVE AND OBJECTIVE BOX
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 Denies any other focal neurological sx. Ocular US done in ED, OU: no lens displacement, no pvd, no vitreous hemorrhage, no retinal detachment. SBP 130s. Initial imaging unremarkable. Loaded with DAPT.     Interval Hx: Pt states that 1/31 he lost vision in right eye with episodes of dizziness. 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 Denies eye pain, redness, acute new floaters, or flashes of light.             T(C): 36.6 (01-31-23 @ 19:45), Max: 36.6 (01-31-23 @ 19:45)  HR: 84 (01-31-23 @ 19:45) (76 - 99)  BP: 132/64 (01-31-23 @ 19:45) (109/74 - 133/74)  RR: 16 (01-31-23 @ 19:45) (16 - 20)  SpO2: 100% (01-31-23 @ 19:45) (100% - 100%)    MEDICATION RECONCILIATION   MEDICATIONS  (STANDING):  atorvastatin 80 milliGRAM(s) Oral at bedtime  enoxaparin Injectable 40 milliGRAM(s) SubCutaneous every 24 hours    MEDICATIONS  (PRN):    Allergies    No Known Allergies    Intolerances      Vital Signs Last 24 Hrs  T(C): 36.6 (31 Jan 2023 19:45), Max: 36.6 (31 Jan 2023 19:45)  T(F): 97.9 (31 Jan 2023 19:45), Max: 97.9 (31 Jan 2023 19:45)  HR: 84 (31 Jan 2023 19:45) (76 - 99)  BP: 132/64 (31 Jan 2023 19:45) (109/74 - 133/74)  BP(mean): --  RR: 16 (31 Jan 2023 19:45) (16 - 20)  SpO2: 100% (31 Jan 2023 19:45) (100% - 100%)    Parameters below as of 31 Jan 2023 19:45  Patient On (Oxygen Delivery Method): room air       (31 Jan 2023 20:31)    PAST MEDICAL & SURGICAL HISTORY:    Past Ocular History: CE/IOL OU        MEDICATIONS  (STANDING):  aspirin enteric coated 81 milliGRAM(s) Oral daily  atorvastatin 80 milliGRAM(s) Oral at bedtime  clopidogrel Tablet 75 milliGRAM(s) Oral daily  enoxaparin Injectable 40 milliGRAM(s) SubCutaneous every 24 hours    MEDICATIONS  (PRN):    Allergies & Intolerances:       VITALS: T(C): 36.7 (02-01-23 @ 09:37)  T(F): 98 (02-01-23 @ 09:37), Max: 98 (01-31-23 @ 23:43)  HR: 79 (02-01-23 @ 09:37) (70 - 99)  BP: 99/77 (02-01-23 @ 09:37) (99/77 - 133/74)  RR:  (16 - 20)  SpO2:  (97% - 100%)  Wt(kg): --  General: AAO x 3, appropriate mood and affect    Ophthalmology Exam:  Visual acuity (sc): Hand Motion OD, 20/50 PH 20/25 OS  Pupils: PERRL OU, no APD  Ttono: 14, 11 OU  Extraocular movements (EOMs): Full OU, no pain, no diplopia  CVF Full OS unable OD    Pen Light Exam (PLE)  External: Flat OU  Lids/Lashes/Lacrimal Ducts: Flat OU    Sclera/Conjunctiva: W+Q OU  Cornea: Cl OU  Anterior Chamber: D+F OU    Iris: Flat OU  Lens: PCIOL OU    Fundus Exam: dilated with 1% tropicamide and 2.5% phenylephrine  Approval obtained from primary team for dilation  Patient aware that pupils can remained dilated for at least 4-6 hours  Exam performed with 20D lens    Vitreous: wnl OU  Disc, cup/disc: sharp and pink, 0.4 OU  Macula: wnl OU  Vessels: wnl OU  Periphery: wnl OU    Labs/Imaging:  < from: CT Angio Neck Stroke Protocol w/ IV Cont (01.31.23 @ 19:11) >  Findings: Evaluation of the anterior circulation demonstrates normal   course and caliber of the distal internal carotid arteries. There is a   normal appearance to the bilateral anterior cerebral and middle cerebral   arteries.    Evaluation the posterior circulation demonstrates normal course and   caliber of the bilateral vertebral arteries, vertebrobasilar junction and   basilar artery. The bilateral posterior cerebral arteries are also within   normal limits.    There is no evidence of aneurysm or stenosis .    Status post small left frontal craniotomy.    Impression: Normal CTA of the intracranial circulation. CT angiogram of   the extracranial carotid arteries:    Technique: CT angiogram of the extracranial carotid arteries was   performed was performed utilizing helical slices from the base of the   skull through the mediastinum during bolus injection of intravenous   contrast material. Overlapping reconstructions were obtained to allow for   sagittal and coronal reformatted images. 3-D reconstruction was also   performed.    Findings: Evaluation of the aortic arch and the origin of the great   vessels are within normal limits.    The course and caliber of the bilateral common carotid, internal carotid   and external carotid arteries are within normal limits. There is no   evidence of focal stenosis or dissection. There are mild calcifications   at the bifurcation consistent with mild atherosclerotic disease.    The origin of the vertebral arteries are within normal limits without   evidence of stenosis. The course and caliber of the cervical segments of   the vertebral arteries are normal.    The patient is status post ORIF of the left maxillary sinus. 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    The patient is status post tracheostomy    There is a small opacity seen within the right upper lung (image #768   series 7) that may represent pneumonia. There is a small nodular opacity   seen in the left apex of the lung (image #698 series 7).    Impression: Normal CTA of the extracranial circulation. No evidence of   carotid stenosis.      < end of copied text >  < from: CT Brain Stroke Protocol (01.31.23 @ 19:07) >  Findings: There are no prior studies available for comparison.    There is no evidence of acute intracranial hemorrhage or acute   transcortical infarct. There is a small left frontal craniotomy. There   are scattered foci of low density within the periventricular white matter   consistent with mild microvascular disease. The ventricles are normal in   size and caliber.  There is no evidence of mass-effect or midline shift. There is no   evidence of an intra or extra-axial fluid collection.    Visualized paranasal sinuses and bilateral mastoid air cells are clear.    Impression: Minimal microvascular disease. No acute intracranial injury.    The results of this study were discussed with ANTHONY Del Real from stroke team   at 7:00PM on 1/31/2023 This study was performed on 1/31/2023 at 6:58 PM.    < end of copied text >        Assessment and Recommendations:  67y male w/ pmhx/ochx of Trach and Lung CA consulted for acute vision loss OD    Outpatient follow-up: Patient should follow-up with his/her ophthalmologist or with [facility] Department of Ophthalmology at the address below       [facility] [address] [address]   [facility] DEPARTMENT OF OPHTHALMOLOGY - INITIAL ADULT CONSULT  -----------------------------------------------------------------------------------------------------------------  Altagracia Salvadoryeon Torrez  -----------------------------------------------------------------------------------------------------------------    HPI:     used  HPI: 67y Ugandan speaking male, former heavy tobacco smoker (2PPD, 42py), 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 Denies any other focal neurological sx. Ocular US done in ED, OU: no lens displacement, no pvd, no vitreous hemorrhage, no retinal detachment. SBP 130s. Initial imaging unremarkable. Loaded with DAPT.     Interval Hx: Pt states that 1/31 he lost vision in right eye with episodes of dizziness. 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 Denies eye pain, redness, acute new floaters, or flashes of light.             T(C): 36.6 (01-31-23 @ 19:45), Max: 36.6 (01-31-23 @ 19:45)  HR: 84 (01-31-23 @ 19:45) (76 - 99)  BP: 132/64 (01-31-23 @ 19:45) (109/74 - 133/74)  RR: 16 (01-31-23 @ 19:45) (16 - 20)  SpO2: 100% (01-31-23 @ 19:45) (100% - 100%)    MEDICATION RECONCILIATION   MEDICATIONS  (STANDING):  atorvastatin 80 milliGRAM(s) Oral at bedtime  enoxaparin Injectable 40 milliGRAM(s) SubCutaneous every 24 hours    MEDICATIONS  (PRN):    Allergies    No Known Allergies    Intolerances      Vital Signs Last 24 Hrs  T(C): 36.6 (31 Jan 2023 19:45), Max: 36.6 (31 Jan 2023 19:45)  T(F): 97.9 (31 Jan 2023 19:45), Max: 97.9 (31 Jan 2023 19:45)  HR: 84 (31 Jan 2023 19:45) (76 - 99)  BP: 132/64 (31 Jan 2023 19:45) (109/74 - 133/74)  BP(mean): --  RR: 16 (31 Jan 2023 19:45) (16 - 20)  SpO2: 100% (31 Jan 2023 19:45) (100% - 100%)    Parameters below as of 31 Jan 2023 19:45  Patient On (Oxygen Delivery Method): room air       (31 Jan 2023 20:31)    PAST MEDICAL & SURGICAL HISTORY:    Past Ocular History: CE/IOL OU        MEDICATIONS  (STANDING):  aspirin enteric coated 81 milliGRAM(s) Oral daily  atorvastatin 80 milliGRAM(s) Oral at bedtime  clopidogrel Tablet 75 milliGRAM(s) Oral daily  enoxaparin Injectable 40 milliGRAM(s) SubCutaneous every 24 hours    MEDICATIONS  (PRN):    Allergies & Intolerances:       VITALS: T(C): 36.7 (02-01-23 @ 09:37)  T(F): 98 (02-01-23 @ 09:37), Max: 98 (01-31-23 @ 23:43)  HR: 79 (02-01-23 @ 09:37) (70 - 99)  BP: 99/77 (02-01-23 @ 09:37) (99/77 - 133/74)  RR:  (16 - 20)  SpO2:  (97% - 100%)  Wt(kg): --  General: AAO x 3, appropriate mood and affect    Ophthalmology Exam:  Visual acuity (sc): Hand Motion OD, 20/50 PH 20/25 OS  Pupils: PERRL OU, no APD  Ttono: 14, 11 OU  Extraocular movements (EOMs): Full OU, no pain, no diplopia  CVF Full OS unable OD    Pen Light Exam (PLE)  External: Flat OU  Lids/Lashes/Lacrimal Ducts: Flat OU    Sclera/Conjunctiva: W+Q OU  Cornea: Cl OU  Anterior Chamber: D+F OU    Iris: Flat OU  Lens: PCIOL OU    Fundus Exam: dilated with 1% tropicamide and 2.5% phenylephrine  Approval obtained from primary team for dilation  Patient aware that pupils can remained dilated for at least 4-6 hours  Exam performed with 20D lens    Vitreous: wnl OU  Disc, cup/disc: sharp and pink, 0.4 OU  Macula: wnl OU  Vessels: wnl OU  Periphery: wnl OU    Labs/Imaging:  < from: CT Angio Neck Stroke Protocol w/ IV Cont (01.31.23 @ 19:11) >  Findings: Evaluation of the anterior circulation demonstrates normal   course and caliber of the distal internal carotid arteries. There is a   normal appearance to the bilateral anterior cerebral and middle cerebral   arteries.    Evaluation the posterior circulation demonstrates normal course and   caliber of the bilateral vertebral arteries, vertebrobasilar junction and   basilar artery. The bilateral posterior cerebral arteries are also within   normal limits.    There is no evidence of aneurysm or stenosis .    Status post small left frontal craniotomy.    Impression: Normal CTA of the intracranial circulation. CT angiogram of   the extracranial carotid arteries:    Technique: CT angiogram of the extracranial carotid arteries was   performed was performed utilizing helical slices from the base of the   skull through the mediastinum during bolus injection of intravenous   contrast material. Overlapping reconstructions were obtained to allow for   sagittal and coronal reformatted images. 3-D reconstruction was also   performed.    Findings: Evaluation of the aortic arch and the origin of the great   vessels are within normal limits.    The course and caliber of the bilateral common carotid, internal carotid   and external carotid arteries are within normal limits. There is no   evidence of focal stenosis or dissection. There are mild calcifications   at the bifurcation consistent with mild atherosclerotic disease.    The origin of the vertebral arteries are within normal limits without   evidence of stenosis. The course and caliber of the cervical segments of   the vertebral arteries are normal.    The patient is status post ORIF of the left maxillary sinus. 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    The patient is status post tracheostomy    There is a small opacity seen within the right upper lung (image #768   series 7) that may represent pneumonia. There is a small nodular opacity   seen in the left apex of the lung (image #698 series 7).    Impression: Normal CTA of the extracranial circulation. No evidence of   carotid stenosis.      < end of copied text >  < from: CT Brain Stroke Protocol (01.31.23 @ 19:07) >  Findings: There are no prior studies available for comparison.    There is no evidence of acute intracranial hemorrhage or acute   transcortical infarct. There is a small left frontal craniotomy. There   are scattered foci of low density within the periventricular white matter   consistent with mild microvascular disease. The ventricles are normal in   size and caliber.  There is no evidence of mass-effect or midline shift. There is no   evidence of an intra or extra-axial fluid collection.    Visualized paranasal sinuses and bilateral mastoid air cells are clear.    Impression: Minimal microvascular disease. No acute intracranial injury.    The results of this study were discussed with ANTHONY Del Real from stroke team   at 7:00PM on 1/31/2023 This study was performed on 1/31/2023 at 6:58 PM.    < end of copied text >        Assessment and Recommendations:  67y male w/ pmhx/ochx of Trach and Lung CA consulted for acute vision loss OD          - Stroke work up per neurology, agree with MRI   - can get Head and orbits w/wo contrast  - recommend cardiac echo  - cta head and neck neg    Outpatient follow-up: Patient should follow-up with his/her ophthalmologist or with [facility] Department of Ophthalmology at the address below       [facility] [address] [address]   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 Denies any other focal neurological sx. Ocular US done in ED, OU: no lens displacement, no pvd, no vitreous hemorrhage, no retinal detachment. SBP 130s. Initial imaging unremarkable. Loaded with DAPT.     Interval Hx: Pt states that 1/31 he lost vision in right eye with episodes of dizziness. 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 Denies eye pain, redness, acute new floaters, or flashes of light.             T(C): 36.6 (01-31-23 @ 19:45), Max: 36.6 (01-31-23 @ 19:45)  HR: 84 (01-31-23 @ 19:45) (76 - 99)  BP: 132/64 (01-31-23 @ 19:45) (109/74 - 133/74)  RR: 16 (01-31-23 @ 19:45) (16 - 20)  SpO2: 100% (01-31-23 @ 19:45) (100% - 100%)    MEDICATION RECONCILIATION   MEDICATIONS  (STANDING):  atorvastatin 80 milliGRAM(s) Oral at bedtime  enoxaparin Injectable 40 milliGRAM(s) SubCutaneous every 24 hours    MEDICATIONS  (PRN):    Allergies    No Known Allergies    Intolerances      Vital Signs Last 24 Hrs  T(C): 36.6 (31 Jan 2023 19:45), Max: 36.6 (31 Jan 2023 19:45)  T(F): 97.9 (31 Jan 2023 19:45), Max: 97.9 (31 Jan 2023 19:45)  HR: 84 (31 Jan 2023 19:45) (76 - 99)  BP: 132/64 (31 Jan 2023 19:45) (109/74 - 133/74)  BP(mean): --  RR: 16 (31 Jan 2023 19:45) (16 - 20)  SpO2: 100% (31 Jan 2023 19:45) (100% - 100%)    Parameters below as of 31 Jan 2023 19:45  Patient On (Oxygen Delivery Method): room air       (31 Jan 2023 20:31)    PAST MEDICAL & SURGICAL HISTORY:    Past Ocular History: CE/IOL OU        MEDICATIONS  (STANDING):  aspirin enteric coated 81 milliGRAM(s) Oral daily  atorvastatin 80 milliGRAM(s) Oral at bedtime  clopidogrel Tablet 75 milliGRAM(s) Oral daily  enoxaparin Injectable 40 milliGRAM(s) SubCutaneous every 24 hours    MEDICATIONS  (PRN):    Allergies & Intolerances:       VITALS: T(C): 36.7 (02-01-23 @ 09:37)  T(F): 98 (02-01-23 @ 09:37), Max: 98 (01-31-23 @ 23:43)  HR: 79 (02-01-23 @ 09:37) (70 - 99)  BP: 99/77 (02-01-23 @ 09:37) (99/77 - 133/74)  RR:  (16 - 20)  SpO2:  (97% - 100%)  Wt(kg): --  General: AAO x 3, appropriate mood and affect    Ophthalmology Exam:  Visual acuity (sc): CF OD PH 20/40 OD, 20/50 PH 20/25 OS  Pupils: PERRL OU, no APD  Ttono: 14, 11 OU  Extraocular movements (EOMs): Full OU, no pain, no diplopia  CVF Full OS unable OD    Pen Light Exam (PLE)  External: Flat OU  Lids/Lashes/Lacrimal Ducts: Flat OU    Sclera/Conjunctiva: W+Q OU  Cornea: Cl OU  Anterior Chamber: D+F OU    Iris: Flat OU  Lens: PCIOL OU    Fundus Exam: dilated with 1% tropicamide and 2.5% phenylephrine  Approval obtained from primary team for dilation  Patient aware that pupils can remained dilated for at least 4-6 hours  Exam performed with 20D lens    Vitreous: wnl OU  Disc, cup/disc: sharp and pink, 0.4 OU  Macula: wnl OU, large hypopigmented scar inferior arcade  Vessels: wnl OU  Periphery: lattice OD inferiorly, wnl OS    Labs/Imaging:  < from: CT Angio Neck Stroke Protocol w/ IV Cont (01.31.23 @ 19:11) >  Findings: Evaluation of the anterior circulation demonstrates normal   course and caliber of the distal internal carotid arteries. There is a   normal appearance to the bilateral anterior cerebral and middle cerebral   arteries.    Evaluation the posterior circulation demonstrates normal course and   caliber of the bilateral vertebral arteries, vertebrobasilar junction and   basilar artery. The bilateral posterior cerebral arteries are also within   normal limits.    There is no evidence of aneurysm or stenosis .    Status post small left frontal craniotomy.    Impression: Normal CTA of the intracranial circulation. CT angiogram of   the extracranial carotid arteries:    Technique: CT angiogram of the extracranial carotid arteries was   performed was performed utilizing helical slices from the base of the   skull through the mediastinum during bolus injection of intravenous   contrast material. Overlapping reconstructions were obtained to allow for   sagittal and coronal reformatted images. 3-D reconstruction was also   performed.    Findings: Evaluation of the aortic arch and the origin of the great   vessels are within normal limits.    The course and caliber of the bilateral common carotid, internal carotid   and external carotid arteries are within normal limits. There is no   evidence of focal stenosis or dissection. There are mild calcifications   at the bifurcation consistent with mild atherosclerotic disease.    The origin of the vertebral arteries are within normal limits without   evidence of stenosis. The course and caliber of the cervical segments of   the vertebral arteries are normal.    The patient is status post ORIF of the left maxillary sinus. 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    The patient is status post tracheostomy    There is a small opacity seen within the right upper lung (image #768   series 7) that may represent pneumonia. There is a small nodular opacity   seen in the left apex of the lung (image #698 series 7).    Impression: Normal CTA of the extracranial circulation. No evidence of   carotid stenosis.      < end of copied text >  < from: CT Brain Stroke Protocol (01.31.23 @ 19:07) >  Findings: There are no prior studies available for comparison.    There is no evidence of acute intracranial hemorrhage or acute   transcortical infarct. There is a small left frontal craniotomy. There   are scattered foci of low density within the periventricular white matter   consistent with mild microvascular disease. The ventricles are normal in   size and caliber.  There is no evidence of mass-effect or midline shift. There is no   evidence of an intra or extra-axial fluid collection.    Visualized paranasal sinuses and bilateral mastoid air cells are clear.    Impression: Minimal microvascular disease. No acute intracranial injury.    The results of this study were discussed with ANTHONY Del Real from stroke team   at 7:00PM on 1/31/2023 This study was performed on 1/31/2023 at 6:58 PM.    < end of copied text >        Assessment and Recommendations:  67y male w/ pmhx/ochx of Trach and Lung CA consulted for acute vision loss OD          Cataract OD>>OS  - likely etiology of vision loss, pt states that he woke up and noticed that he couldnt see out of right eye  - macula looks normal, no whitening of the retina to suggest CRAO  - pt does have hypopigmented scar OD   - vision corrects to 20/40 OD with pinhole, likely all from cataract.  - cta head and neck neg  - recommend outpatient f/u with cataract surgery OD then OS. Pt lives in [place], can go to [facility] or can follow up with us.       Outpatient follow-up: Patient should follow-up with his/her ophthalmologist or with [facility] Department of Ophthalmology at the address below       [facility] [address] [address]   [facility] DEPARTMENT OF OPHTHALMOLOGY - INITIAL ADULT CONSULT  -----------------------------------------------------------------------------------------------------------------  Altagracia Salvadoryeon Torrez  -----------------------------------------------------------------------------------------------------------------    HPI:     used  HPI: 67y Luxembourger speaking male, former heavy tobacco smoker (2PPD, 42py), 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 Denies any other focal neurological sx. Ocular US done in ED, OU: no lens displacement, no pvd, no vitreous hemorrhage, no retinal detachment. SBP 130s. Initial imaging unremarkable. Loaded with DAPT.     Interval Hx: Pt states that 1/31 he lost vision in right eye with episodes of dizziness. 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 Denies eye pain, redness, acute new floaters, or flashes of light.             T(C): 36.6 (01-31-23 @ 19:45), Max: 36.6 (01-31-23 @ 19:45)  HR: 84 (01-31-23 @ 19:45) (76 - 99)  BP: 132/64 (01-31-23 @ 19:45) (109/74 - 133/74)  RR: 16 (01-31-23 @ 19:45) (16 - 20)  SpO2: 100% (01-31-23 @ 19:45) (100% - 100%)    MEDICATION RECONCILIATION   MEDICATIONS  (STANDING):  atorvastatin 80 milliGRAM(s) Oral at bedtime  enoxaparin Injectable 40 milliGRAM(s) SubCutaneous every 24 hours    MEDICATIONS  (PRN):    Allergies    No Known Allergies    Intolerances      Vital Signs Last 24 Hrs  T(C): 36.6 (31 Jan 2023 19:45), Max: 36.6 (31 Jan 2023 19:45)  T(F): 97.9 (31 Jan 2023 19:45), Max: 97.9 (31 Jan 2023 19:45)  HR: 84 (31 Jan 2023 19:45) (76 - 99)  BP: 132/64 (31 Jan 2023 19:45) (109/74 - 133/74)  BP(mean): --  RR: 16 (31 Jan 2023 19:45) (16 - 20)  SpO2: 100% (31 Jan 2023 19:45) (100% - 100%)    Parameters below as of 31 Jan 2023 19:45  Patient On (Oxygen Delivery Method): room air       (31 Jan 2023 20:31)    PAST MEDICAL & SURGICAL HISTORY:    Past Ocular History: CE/IOL OU        MEDICATIONS  (STANDING):  aspirin enteric coated 81 milliGRAM(s) Oral daily  atorvastatin 80 milliGRAM(s) Oral at bedtime  clopidogrel Tablet 75 milliGRAM(s) Oral daily  enoxaparin Injectable 40 milliGRAM(s) SubCutaneous every 24 hours    MEDICATIONS  (PRN):    Allergies & Intolerances:       VITALS: T(C): 36.7 (02-01-23 @ 09:37)  T(F): 98 (02-01-23 @ 09:37), Max: 98 (01-31-23 @ 23:43)  HR: 79 (02-01-23 @ 09:37) (70 - 99)  BP: 99/77 (02-01-23 @ 09:37) (99/77 - 133/74)  RR:  (16 - 20)  SpO2:  (97% - 100%)  Wt(kg): --  General: AAO x 3, appropriate mood and affect    Ophthalmology Exam:  Visual acuity (sc): CF OD PH 20/40 OD, 20/50 PH 20/25 OS  Pupils: PERRL OU, no APD  Ttono: 14, 11 OU  Extraocular movements (EOMs): Full OU, no pain, no diplopia  CVF Full OS unable OD    Pen Light Exam (PLE)  External: Flat OU  Lids/Lashes/Lacrimal Ducts: Flat OU    Sclera/Conjunctiva: W+Q OU  Cornea: Cl OU  Anterior Chamber: D+F OU    Iris: Flat OU  Lens: PCIOL OU    Fundus Exam: dilated with 1% tropicamide and 2.5% phenylephrine  Approval obtained from primary team for dilation  Patient aware that pupils can remained dilated for at least 4-6 hours  Exam performed with 20D lens    Vitreous: wnl OU  Disc, cup/disc: sharp and pink, 0.4 OU  Macula: wnl OU, large hypopigmented scar inferior arcade  Vessels: wnl OU  Periphery: lattice OD inferiorly, wnl OS    Labs/Imaging:  < from: CT Angio Neck Stroke Protocol w/ IV Cont (01.31.23 @ 19:11) >  Findings: Evaluation of the anterior circulation demonstrates normal   course and caliber of the distal internal carotid arteries. There is a   normal appearance to the bilateral anterior cerebral and middle cerebral   arteries.    Evaluation the posterior circulation demonstrates normal course and   caliber of the bilateral vertebral arteries, vertebrobasilar junction and   basilar artery. The bilateral posterior cerebral arteries are also within   normal limits.    There is no evidence of aneurysm or stenosis .    Status post small left frontal craniotomy.    Impression: Normal CTA of the intracranial circulation. CT angiogram of   the extracranial carotid arteries:    Technique: CT angiogram of the extracranial carotid arteries was   performed was performed utilizing helical slices from the base of the   skull through the mediastinum during bolus injection of intravenous   contrast material. Overlapping reconstructions were obtained to allow for   sagittal and coronal reformatted images. 3-D reconstruction was also   performed.    Findings: Evaluation of the aortic arch and the origin of the great   vessels are within normal limits.    The course and caliber of the bilateral common carotid, internal carotid   and external carotid arteries are within normal limits. There is no   evidence of focal stenosis or dissection. There are mild calcifications   at the bifurcation consistent with mild atherosclerotic disease.    The origin of the vertebral arteries are within normal limits without   evidence of stenosis. The course and caliber of the cervical segments of   the vertebral arteries are normal.    The patient is status post ORIF of the left maxillary sinus. 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    The patient is status post tracheostomy    There is a small opacity seen within the right upper lung (image #768   series 7) that may represent pneumonia. There is a small nodular opacity   seen in the left apex of the lung (image #698 series 7).    Impression: Normal CTA of the extracranial circulation. No evidence of   carotid stenosis.      < end of copied text >  < from: CT Brain Stroke Protocol (01.31.23 @ 19:07) >  Findings: There are no prior studies available for comparison.    There is no evidence of acute intracranial hemorrhage or acute   transcortical infarct. There is a small left frontal craniotomy. There   are scattered foci of low density within the periventricular white matter   consistent with mild microvascular disease. The ventricles are normal in   size and caliber.  There is no evidence of mass-effect or midline shift. There is no   evidence of an intra or extra-axial fluid collection.    Visualized paranasal sinuses and bilateral mastoid air cells are clear.    Impression: Minimal microvascular disease. No acute intracranial injury.    The results of this study were discussed with ANTHONY Del Real from stroke team   at 7:00PM on 1/31/2023 This study was performed on 1/31/2023 at 6:58 PM.    < end of copied text >        Assessment and Recommendations:  67y male w/ pmhx/ochx of Trach and Lung CA consulted for acute vision loss OD          Cataract OD>>OS  - likely etiology of vision loss, pt states that he woke up and noticed that he couldnt see out of right eye  - macula looks normal, no whitening of the retina to suggest CRAO. Although no evidence of CRAO, agree with MRI H&Orbits.   - pt does have hypopigmented scar OD   - vision corrects to 20/40 OD with pinhole, likely all from cataract.  - cta head and neck neg  - recommend outpatient f/u with cataract surgery OD then OS. Pt lives in [place], can go to [facility] or can follow up with us.       Outpatient follow-up: Patient should follow-up with his/her ophthalmologist or with [facility] Department of Ophthalmology at the address below       [facility] [address] [address]

## 2023-02-01 NOTE — PHYSICAL THERAPY INITIAL EVALUATION ADULT - SENSORY TESTS
R hand dominant; (L) hand  5/5, (R) hand  5/5. CN Testing: B/L Frontalis intact; B/L buccinator intact; smile symmetrical; tongue protrusion at midline; B/L eyes open/close intact; Shoulder elevation: intact bilaterally; Vision H-Test: bilateral tracking and smooth pursuit intact; Convergence/Divergence: intact; Vision Quadrant Test: intact bilaterally, but pt with difficulty R temporal fields, reports blurry vision

## 2023-02-01 NOTE — OCCUPATIONAL THERAPY INITIAL EVALUATION ADULT - MODALITIES TREATMENT COMMENTS
Cranial Nerves II - XII: II: PERRLA; visual fields are full to confrontation III, IV, VI: EOMI, no nystagmus appreciated V: facial sensation intact to light touch V1-V3 b/l VII: no ptosis, no facial droop, symmetric eyebrow raise and closure VIII: DECREASE HEARING ON LEFT SIDE COMPARED TO RIGHT SIDE  b/l  XI: head turning and shoulder shrug intact b/l XII: tongue protrusion midline

## 2023-02-01 NOTE — PROGRESS NOTE ADULT - SUBJECTIVE AND OBJECTIVE BOX
ID# [id number]    Interval History:  Patient seen and examined at bedside. No acute events overnight. Able to eat and sleep comfortably. Reports continued loss of vision in right eye and black spots seen through left eye for 3 days. No eye pain, but has bilateral pruritis. Continues to have dizziness with standing up quickly. No headache or weakness. Reports sensitivity to light in left eye.     Physical Exam:  Neurologic:  -Mental status: Awake and alert. Speech is fluent, slight dysarthria though speaking with trach in place. Follows commands.   -Cranial nerves:   II: Impaired vision in right eye. Left eye full to confrontation of all visual fields  III, IV, VI: Extraocular movements are intact without nystagmus. Pupils equally round and reactive to light  V:  Facial sensation V1-V3 equal and intact   VII: Face appears symmetric  Motor: Normal bulk and tone. No pronator drift. Strength bilateral upper extremity 5/5 with questionable right finger curling/pulling on oxygen line, bilateral lower extremities 5/5.  Sensation: Intact to light touch bilaterally. No neglect or extinction on double simultaneous testing.          ID# [id number]    Interval History:  Patient seen and examined at bedside. No acute events overnight. Able to eat and sleep comfortably. Reports continued loss of vision in right eye and black spots seen through left eye for 3 days. No eye pain, but has bilateral pruritis. Continues to have dizziness with standing up quickly. No headache or weakness. Reports sensitivity to light in left eye.     Physical Exam:  Neurologic:  -Mental status: Awake and alert. Speech is fluent, slight dysarthria though speaking with trach in place. Follows commands.   -Cranial nerves:   II: Impaired vision in right eye. Left eye full to confrontation of all visual fields  III, IV, VI: Extraocular movements are intact without nystagmus. Pupils equally round and reactive to light  V:  Facial sensation V1-V3 equal and intact   VII: Face appears symmetric  Motor: Normal bulk and tone. No pronator drift. Strength bilateral upper extremity 5/5 with questionable right finger curling/pulling on oxygen line, bilateral lower extremities 5/5.  Sensation: Intact to light touch bilaterally. No neglect or extinction on double simultaneous testing.      Vital Signs Last 24 Hrs  T(C): 36.7 (01 Feb 2023 09:37), Max: 36.7 (31 Jan 2023 23:43)  T(F): 98 (01 Feb 2023 09:37), Max: 98 (31 Jan 2023 23:43)  HR: 79 (01 Feb 2023 09:37) (70 - 99)  BP: 99/77 (01 Feb 2023 09:37) (99/77 - 133/74)  BP(mean): --  RR: 18 (01 Feb 2023 09:37) (16 - 20)  SpO2: 97% (01 Feb 2023 09:37) (97% - 100%)    Parameters below as of 01 Feb 2023 09:37  Patient On (Oxygen Delivery Method): room air      ID# [id number]    Interval History:  Patient seen and examined at bedside. No acute events overnight. Able to eat and sleep comfortably. Reports continued loss of vision in right eye and black spots seen through left eye for 3 days. No eye pain, but has bilateral pruritis. Continues to have dizziness with standing up quickly. No headache or weakness. Reports sensitivity to light in left eye.     Physical Exam:  Neurologic:  -Mental status: Awake and alert. Speech is fluent, slight dysarthria though speaking with trach in place. Follows commands.   -Cranial nerves:   II: Impaired vision in right eye. Left eye full to confrontation of all visual fields  III, IV, VI: Extraocular movements are intact without nystagmus. Pupils equally round and reactive to light  V:  Facial sensation V1-V3 equal and intact   VII: Face appears symmetric  Motor: Normal bulk and tone. No pronator drift. Strength bilateral upper extremity 5/5, bilateral lower extremities 5/5.  Sensation: Intact to light touch bilaterally. No neglect or extinction on double simultaneous testing.      Vital Signs Last 24 Hrs  T(C): 36.7 (01 Feb 2023 09:37), Max: 36.7 (31 Jan 2023 23:43)  T(F): 98 (01 Feb 2023 09:37), Max: 98 (31 Jan 2023 23:43)  HR: 79 (01 Feb 2023 09:37) (70 - 99)  BP: 99/77 (01 Feb 2023 09:37) (99/77 - 133/74)  BP(mean): --  RR: 18 (01 Feb 2023 09:37) (16 - 20)  SpO2: 97% (01 Feb 2023 09:37) (97% - 100%)    Parameters below as of 01 Feb 2023 09:37  Patient On (Oxygen Delivery Method): room air

## 2023-02-02 DIAGNOSIS — I95.9 HYPOTENSION, UNSPECIFIED: ICD-10-CM

## 2023-02-02 LAB
ANION GAP SERPL CALC-SCNC: 10 MMOL/L — SIGNIFICANT CHANGE UP (ref 5–17)
BUN SERPL-MCNC: 21 MG/DL — SIGNIFICANT CHANGE UP (ref 7–23)
CALCIUM SERPL-MCNC: 9.7 MG/DL — SIGNIFICANT CHANGE UP (ref 8.4–10.5)
CHLORIDE SERPL-SCNC: 101 MMOL/L — SIGNIFICANT CHANGE UP (ref 96–108)
CO2 SERPL-SCNC: 27 MMOL/L — SIGNIFICANT CHANGE UP (ref 22–31)
CREAT SERPL-MCNC: 1.21 MG/DL — SIGNIFICANT CHANGE UP (ref 0.5–1.3)
EGFR: 66 ML/MIN/1.73M2 — SIGNIFICANT CHANGE UP
GLUCOSE SERPL-MCNC: 98 MG/DL — SIGNIFICANT CHANGE UP (ref 70–99)
HCT VFR BLD CALC: 34 % — LOW (ref 39–50)
HGB BLD-MCNC: 11.1 G/DL — LOW (ref 13–17)
MAGNESIUM SERPL-MCNC: 1.8 MG/DL — SIGNIFICANT CHANGE UP (ref 1.6–2.6)
MCHC RBC-ENTMCNC: 32 PG — SIGNIFICANT CHANGE UP (ref 27–34)
MCHC RBC-ENTMCNC: 32.6 GM/DL — SIGNIFICANT CHANGE UP (ref 32–36)
MCV RBC AUTO: 98 FL — SIGNIFICANT CHANGE UP (ref 80–100)
NRBC # BLD: 0 /100 WBCS — SIGNIFICANT CHANGE UP (ref 0–0)
PHOSPHATE SERPL-MCNC: 3.3 MG/DL — SIGNIFICANT CHANGE UP (ref 2.5–4.5)
PLATELET # BLD AUTO: 246 K/UL — SIGNIFICANT CHANGE UP (ref 150–400)
POTASSIUM SERPL-MCNC: 3.9 MMOL/L — SIGNIFICANT CHANGE UP (ref 3.5–5.3)
POTASSIUM SERPL-SCNC: 3.9 MMOL/L — SIGNIFICANT CHANGE UP (ref 3.5–5.3)
RBC # BLD: 3.47 M/UL — LOW (ref 4.2–5.8)
RBC # FLD: 12.6 % — SIGNIFICANT CHANGE UP (ref 10.3–14.5)
SODIUM SERPL-SCNC: 138 MMOL/L — SIGNIFICANT CHANGE UP (ref 135–145)
WBC # BLD: 6.95 K/UL — SIGNIFICANT CHANGE UP (ref 3.8–10.5)
WBC # FLD AUTO: 6.95 K/UL — SIGNIFICANT CHANGE UP (ref 3.8–10.5)

## 2023-02-02 PROCEDURE — 99254 IP/OBS CNSLTJ NEW/EST MOD 60: CPT

## 2023-02-02 PROCEDURE — 99253 IP/OBS CNSLTJ NEW/EST LOW 45: CPT | Mod: 25

## 2023-02-02 PROCEDURE — 73080 X-RAY EXAM OF ELBOW: CPT | Mod: 26,LT

## 2023-02-02 PROCEDURE — 99233 SBSQ HOSP IP/OBS HIGH 50: CPT

## 2023-02-02 PROCEDURE — 31502 CHANGE OF WINDPIPE AIRWAY: CPT

## 2023-02-02 RX ADMIN — Medication 81 MILLIGRAM(S): at 11:52

## 2023-02-02 RX ADMIN — ATORVASTATIN CALCIUM 80 MILLIGRAM(S): 80 TABLET, FILM COATED ORAL at 21:30

## 2023-02-02 RX ADMIN — CLOPIDOGREL BISULFATE 75 MILLIGRAM(S): 75 TABLET, FILM COATED ORAL at 11:51

## 2023-02-02 RX ADMIN — ENOXAPARIN SODIUM 40 MILLIGRAM(S): 100 INJECTION SUBCUTANEOUS at 21:30

## 2023-02-02 NOTE — DISCHARGE NOTE PROVIDER - NSDCCPCAREPLAN_GEN_ALL_CORE_FT
PRINCIPAL DISCHARGE DIAGNOSIS  Diagnosis: Cataract  Assessment and Plan of Treatment: Maryjane information and education regarding Cataracts provided to patient in [place]. The ophthalmologist (eye doctor) examined both of your eyes and found cataracts in both of your eyes, worsened in the right eye. You are instructued to follow up with ophthalmologists at [facility] whom take patients without insurance for follow up. You will require cataract surgery in both eyes. In order to help your vision, please follow up with an ophthalmologist or optometrist for an updated glasses prescription. Unfortunately, nothing else will help your vision besides removal of the cataract.      SECONDARY DISCHARGE DIAGNOSES  Diagnosis: History of tracheostomy  Assessment and Plan of Treatment: You have a history of throat cancer treated with chemotherapy and radiation and was resected with a trach in place. Your trach is around 7 years old. ENT (throat doctors) were consulted and your trach was replaced with a reusable shiley. You are tolerating your trach with cap well, however, it is important that you follow up with a ENT doctor to establish care for throat cancer workup. ENT at [facility] will be able to provide care to you without insurance.

## 2023-02-02 NOTE — CONSULT NOTE ADULT - ASSESSMENT
-------------------------------  ASSESSMENT/PLAN:    IMPRESSION: LINDEN BAZZI  is a 67y Czech speaking male, former heavy tobacco smoker (2PPD, 42py), 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 Denies any other focal neurological sx. Ocular US done in ED, OU: no lens displacement, no pvd, no vitreous hemorrhage, no retinal detachment. SBP 130s. Initial imaging unremarkable. Loaded with DAPT.  ENT consulted as patient mentioned he has not had a trach change in 7 years.  He states in [place] he underwent chemo/radiaion and a surgical dissection and tracheostomy in 2017 for a form of throat cancer. Patient was unable to provide any additional information.  Tracheostomy was changed to a [id number].     RECOMMENDATIONS:  - Continue nursing education regarding tracheostomy care  - Recommend PMV for talking   - Continue care as per primary team  - Recommend for patient to follow up with an ENT for routine care and follow up   ---  Thank you for the kind referral and for allowing me to share in the care of LINDEN BAZZI If you have any questions, please do not hesitate to contact me.     Sincerely,  Liset Boston PA-C  02-02-23 @ 13:04    
per Internal Medicine    67 y o Nepali speaking male, former heavy tobacco smoker (2PPD, 42py), throat ca s/p resection/trach to air (2017), ?HTN, no meds, coming in with a couple  episodes of intermittent dizziness, worse with head movement and also complete right eye vision loss since 1:30 morning of 1/31. Ocular US done in ED, no lens displacement, no pvd, no vitreous hemorrhage, no retinal detachment.    SBP 130s. Initial imaging unremarkable. Loaded with DAPT. NIHSS 3. OOW fortenecteplase. No LVO on CTA. Optho consulted in ED for CRAO. Admitted for further stroke workup.     Neuro  #CVA workup  - s/p dapt load  - continue aspirin 81mg and plavix 75mg daily  - continue atorvastatin 80mg daily  - q4hr stroke neuro checks and vitals  - obtain MRI Brain without contrast  - Stroke Code HCT Results: neg  - Stroke Code CTA Results: neg  - Stroke education  - ESR 10  - Ophthalmology consult: MRI head and orbits with and without contrast; outpatient follow-up with his ophthalmologist or with [facility] Department of Ophthalmology   [facility] [address] [address]      Cards  #HTN  - blood pressure 99/77 this morning, will monitor  - permissive hypertension, Goal -180  - hold home blood pressure medication for now  - TTE with bubble - normal  - Stroke Code EKG Results:   · EKG Date/Time: 31-Jan-2023 18:34  · Rate: 92  · Axis: Normal  · PA: 140  · QRS: 74  · QTC: 437  · ST/T Wave: no st/t changes    #HLD  - high dose statin as above in CVA  - LDL results: 77    Pulm  - call provider if SPO2 < 94%  #Tracheostomy  - throat cancer resection 7 years ago in [facility]; no further details known by patient and hospital has since closed; he will ask sister for more information    GI  #Nutrition/Fluids/Electrolytes   - replete K<4 and Mg <2  - Diet: reg once passed dysphagia  - IVF: none indicated at this time    Renal  - trend BMP    Infectious Disease  - Stroke Code CXR results:  No evidence of acute cardiopulmonary disease. Incidentally seen is a small opacification with some retraction in the right upper lobe, likely mucoid impaction. Further evaluation on a nonemergent basis with chest CT may be pursued if clinically warranted.    Endocrine  - A1C results: 5.0  - TSH results: 3.970    DVT Prophylaxis  - lovenox sq for DVT prophylaxis   - SCDs for DVT prophylaxis 
67M Luxembourgish speaker, former smoker, h/o throat CA s/p resection w tracheostomy w speaking valve in [facility] (2017), b/l cataracts, p/w dizziness, loss of vision in R eye since 1/31 and red flashes in L eye, admitted for CVA evaluation, also seen by ophthalmology    #CVA R/O - R vision loss, L sided flashes vs chronic floater  A1C 5.7, LDL 77, TSH 3.97   - Ophthalmology also evaluating - Recommending b/l cataract surgery   - MRI orbits wo acute process  TTE - nml LVEF   - on DAPT, atorva  #s/p tracheostomy - 2017 - s/p throat CA resection - uses speaking valve.     Recommend  Overall pt reports dizziness improved today.   Agree w ENT c/s for trach care - now s/p exchange. However pt expresses more difficulty speaking w new trach - admits he has to take a breath more mid sentence. There is increased air leakage on examination and pt's voice is more strained -- would call ENT to re-evaluate    DISPO: Home w Home PT  Follow-up will need to establish primary care.

## 2023-02-02 NOTE — DISCHARGE NOTE PROVIDER - CARE PROVIDER_API CALL
Aidan Baez  [facility]  [address] [place] [id number]  Phone: [phone]  Fax: (   )    -  Follow Up Time: 2 weeks    Janes Gonzalez  [facility]/[place] [address]  Phone: [phone]  Fax: (   )    -  Follow Up Time: 2 weeks   Janes oGnzalez  [facility]/[place] [address]  Phone: [phone]  Fax: (   )    -  Follow Up Time: 2 weeks    Rosalba Gonzalez  [facility]/[place] [address]  Phone: (   )    -  Fax: (   )    -  Follow Up Time: 2 weeks

## 2023-02-02 NOTE — PROGRESS NOTE ADULT - SUBJECTIVE AND OBJECTIVE BOX
Neurology Stroke Progress Note    INTERVAL HPI/OVERNIGHT EVENTS:  MRI obtained overnight, negative for acute infarct, no other Patient seen and examined.  number ______ used.    MEDICATIONS  (STANDING):  aspirin enteric coated 81 milliGRAM(s) Oral daily  atorvastatin 80 milliGRAM(s) Oral at bedtime  clopidogrel Tablet 75 milliGRAM(s) Oral daily  enoxaparin Injectable 40 milliGRAM(s) SubCutaneous every 24 hours  influenza  Vaccine (HIGH DOSE) 0.7 milliLiter(s) IntraMuscular once    MEDICATIONS  (PRN):      Allergies    No Known Allergies    Intolerances        Vital Signs Last 24 Hrs  T(C): 36.5 (02 Feb 2023 09:27), Max: 37 (01 Feb 2023 20:25)  T(F): 97.7 (02 Feb 2023 09:27), Max: 98.6 (01 Feb 2023 20:25)  HR: 77 (02 Feb 2023 08:24) (67 - 89)  BP: 112/63 (02 Feb 2023 08:24) (99/77 - 136/74)  BP(mean): 82 (02 Feb 2023 08:24) (75 - 97)  RR: 18 (02 Feb 2023 08:24) (16 - 18)  SpO2: 99% (02 Feb 2023 08:24) (97% - 99%)    Parameters below as of 02 Feb 2023 08:24  Patient On (Oxygen Delivery Method): room air        Physical exam:  General: No acute distress, awake and alert  Eyes: Anicteric sclerae, moist conjunctivae, see below for CNs  Neck: trachea midline, FROM, supple, no thyromegaly or lymphadenopathy  Cardiovascular: Regular rate and rhythm, no murmurs, rubs, or gallops. No carotid bruits.   Pulmonary: Anterior breath sounds clear bilaterally, no crackles or wheezing. No use of accessory muscles  GI: Abdomen soft, non-distended, non-tender  Extremities: Radial and DP pulses +2, no edema    Neurologic:  -Mental status: Awake, alert, oriented to person, place, and time. Speech is fluent with intact naming, repetition, and comprehension, no dysarthria. Recent and remote memory intact. Follows commands. Attention/concentration intact. Fund of knowledge appropriate.  -Cranial nerves:   II: Visual fields are full to confrontation.  III, IV, VI: Extraocular movements are intact without nystagmus. Pupils equally round and reactive to light  V:  Facial sensation V1-V3 equal and intact   VII: Face is symmetric with normal eye closure and smile  VIII: Hearing is bilaterally intact to finger rub  IX, X: Uvula is midline and soft palate rises symmetrically  XI: Head turning and shoulder shrug are intact.  XII: Tongue protrudes midline  Motor: Normal bulk and tone. No pronator drift. Strength bilateral upper extremity 5/5, bilateral lower extremities 5/5.  Rapid alternating movements intact and symmetric  Sensation: Intact to light touch bilaterally. No neglect or extinction on double simultaneous testing.  Coordination: No dysmetria on finger-to-nose and heel-to-shin bilaterally  Reflexes: Downgoing toes bilaterally   Gait: Narrow gait and steady    LABS:                        11.1   6.95  )-----------( 246      ( 02 Feb 2023 05:52 )             34.0     02-02    138  |  101  |  21  ----------------------------<  98  3.9   |  27  |  1.21    Ca    9.7      02 Feb 2023 05:52  Phos  3.3     02-02  Mg     1.8     02-02    TPro  6.5  /  Alb  3.8  /  TBili  0.5  /  DBili  x   /  AST  25  /  ALT  30  /  AlkPhos  60  01-31    PT/INR - ( 31 Jan 2023 19:25 )   PT: 12.8 sec;   INR: 1.07          PTT - ( 31 Jan 2023 19:25 )  PTT:29.6 sec      RADIOLOGY & ADDITIONAL TESTS:

## 2023-02-02 NOTE — CONSULT NOTE ADULT - SUBJECTIVE AND OBJECTIVE BOX
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 Denies any other focal neurological sx. Ocular US done in ED, OU: no lens displacement, no pvd, no vitreous hemorrhage, no retinal detachment. SBP 130s. Initial imaging unremarkable. Loaded with DAPT. "    67M Swiss speaker, former smoker, h/o throat CA s/p resection w tracheostomy w speaking valve in [facility] (2017), b/l cataracts, p/w dizziness, loss of vision in R eye since 1/31 and red flashes in L eye, admitted for CVA evaluation, also seen by ophthalmology    Pt reports being in usual state of health - does not take any medications or follow w a physician here in the [place]. Reports having vision loss in his R eye and difficulty w vision in L did. Denies any recent head injury, falls, trauma. No fever, chills, sweats, chest pain, N/V/Abd pain, dysuria, diarrhea.  Regarding trach care - pt reports he does not follow w anyone here in the [place]. Admits to cleaning tracheostomy by wiping w a napkin. He denies any fever, increased sputum, dyspnea, chest discomfort. Denies any coughing or difficulty eating/swallowing.    ROS: 12 point ROS reviewed and otherwise negative  FH: No DVT/PE  SH: Non smoker    PAST MEDICAL & SURGICAL HISTORY:      FAMILY HISTORY:      T(C): 36.6 (01-31-23 @ 19:45), Max: 36.6 (01-31-23 @ 19:45)  HR: 84 (01-31-23 @ 19:45) (76 - 99)  BP: 132/64 (01-31-23 @ 19:45) (109/74 - 133/74)  RR: 16 (01-31-23 @ 19:45) (16 - 20)  SpO2: 100% (01-31-23 @ 19:45) (100% - 100%)    MEDICATION RECONCILIATION   MEDICATIONS  (STANDING):  atorvastatin 80 milliGRAM(s) Oral at bedtime  enoxaparin Injectable 40 milliGRAM(s) SubCutaneous every 24 hours    MEDICATIONS  (PRN):    Allergies    No Known Allergies    Intolerances      Vital Signs Last 24 Hrs  T(C): 36.6 (31 Jan 2023 19:45), Max: 36.6 (31 Jan 2023 19:45)  T(F): 97.9 (31 Jan 2023 19:45), Max: 97.9 (31 Jan 2023 19:45)  HR: 84 (31 Jan 2023 19:45) (76 - 99)  BP: 132/64 (31 Jan 2023 19:45) (109/74 - 133/74)  BP(mean): --  RR: 16 (31 Jan 2023 19:45) (16 - 20)  SpO2: 100% (31 Jan 2023 19:45) (100% - 100%)    Parameters below as of 31 Jan 2023 19:45  Patient On (Oxygen Delivery Method): room air       (31 Jan 2023 20:31)      PAST MEDICAL & SURGICAL HISTORY:    Home Meds: Home Medications:    Allergies: Allergies    No Known Allergies    Intolerances      Soc:   Advanced Directives: Presumed Full Code     CURRENT MEDICATIONS:   --------------------------------------------------------------------------------------  Neurologic Medications    Respiratory Medications    Cardiovascular Medications    Gastrointestinal Medications    Genitourinary Medications    Hematologic/Oncologic Medications  aspirin enteric coated 81 milliGRAM(s) Oral daily  clopidogrel Tablet 75 milliGRAM(s) Oral daily  enoxaparin Injectable 40 milliGRAM(s) SubCutaneous every 24 hours  influenza  Vaccine (HIGH DOSE) 0.7 milliLiter(s) IntraMuscular once    Antimicrobial/Immunologic Medications    Endocrine/Metabolic Medications  atorvastatin 80 milliGRAM(s) Oral at bedtime    Topical/Other Medications    --------------------------------------------------------------------------------------    VITAL SIGNS, INS/OUTS (last 24 hours):  --------------------------------------------------------------------------------------  ICU Vital Signs Last 24 Hrs  T(C): 36.7 (02 Feb 2023 13:33), Max: 37 (01 Feb 2023 20:25)  T(F): 98 (02 Feb 2023 13:33), Max: 98.6 (01 Feb 2023 20:25)  HR: 81 (02 Feb 2023 16:28) (67 - 86)  BP: 94/57 (02 Feb 2023 16:28) (94/57 - 136/74)  BP(mean): 68 (02 Feb 2023 16:28) (68 - 97)  ABP: --  ABP(mean): --  RR: 18 (02 Feb 2023 16:28) (16 - 18)  SpO2: 100% (02 Feb 2023 16:28) (98% - 100%)    O2 Parameters below as of 02 Feb 2023 16:28  Patient On (Oxygen Delivery Method): room air          I&O's Summary    01 Feb 2023 07:01  -  02 Feb 2023 07:00  --------------------------------------------------------  IN: 0 mL / OUT: 750 mL / NET: -750 mL    02 Feb 2023 07:01  -  02 Feb 2023 17:35  --------------------------------------------------------  IN: 525 mL / OUT: 225 mL / NET: 300 mL      --------------------------------------------------------------------------------------    EXAM:  GEN: Male in NAD on RA  HEENT: NC/AT, MMM, Cataracts noted in both eyes  CV: RRR, nml S1S2, no murmurs  PULM: nml effort, CTAB  ABD: Soft, non-distended, NABS, non-tender  NEURO  A/O x3,   Decreased vision in R eye. EOM limited during far lateral gaze on R side.  No facial droop on change in sensation  5/5 in BUE, BLE,   PSYCH: Appropriate    LABS  --------------------------------------------------------------------------------------  Labs:  CAPILLARY BLOOD GLUCOSE                              11.1   6.95  )-----------( 246      ( 02 Feb 2023 05:52 )             34.0         02-02    138  |  101  |  21  ----------------------------<  98  3.9   |  27  |  1.21      Calcium, Total Serum: 9.7 mg/dL (02-02-23 @ 05:52)      LFTs:             6.5  | 0.5  | 25       ------------------[60      ( 31 Jan 2023 19:25 )  3.8  | x    | 30          Lipase:x      Amylase:x             Coags:     12.8   ----< 1.07    ( 31 Jan 2023 19:25 )     29.6        CARDIAC MARKERS ( 31 Jan 2023 19:25 )  x     / <0.01 ng/mL / x     / x     / x                    --------------------------------------------------------------------------------------    OTHER LABS    IMAGING RESULTS  ****************      ASSESSMENT/ PLAN:  
 **STROKE CODE CONSULT NOTE**    Last known well time/Time of onset of symptoms:    HPI: 67y Malian speaking Male former heavy smoker, throat cancer s/p trach, ?HTN but not on any meds, coming in with a couple episodes of dizziness and also right eye vision loss since 1:30 this morning.     T(C): 36.5 (01-31-23 @ 18:33), Max: 36.5 (01-31-23 @ 18:33)  HR: 99 (01-31-23 @ 18:33) (99 - 99)  BP: 109/74 (01-31-23 @ 18:33) (109/74 - 109/74)  RR: 16 (01-31-23 @ 18:33) (16 - 16)  SpO2: 100% (01-31-23 @ 18:33) (100% - 100%)    PAST MEDICAL & SURGICAL HISTORY:      FAMILY HISTORY:      SOCIAL HISTORY:   Smoking status: former heavy smoker    ROS:   Constitutional: No fever, weight loss or fatigue  Eyes: No eye pain, visual disturbances, or discharge  ENMT:  No difficulty hearing, tinnitus; No sinus or throat pain  Neck: No pain or stiffness  Respiratory: No cough, wheezing, chills or hemoptysis  Cardiovascular: No chest pain, palpitations, shortness of breath, or leg swelling  Gastrointestinal: No abdominal pain. No hematemesis; No diarrhea or constipation. Nohematochezia.  Genitourinary: No dysuria, frequency, hematuria or incontinence  Neurological: As per HPI  Skin: No itching, burning, rashes or lesions   Endocrine: No heat or cold intolerance; No hair loss  Musculoskeletal: No joint pain or swelling; No muscle, back or extremity pain  Heme/Lymph: No easy bruising or bleeding gums    MEDICATIONS  (STANDING):    MEDICATIONS  (PRN):    Allergies    No Known Allergies    Intolerances      Vital Signs Last 24 Hrs  T(C): 36.5 (31 Jan 2023 18:33), Max: 36.5 (31 Jan 2023 18:33)  T(F): 97.7 (31 Jan 2023 18:33), Max: 97.7 (31 Jan 2023 18:33)  HR: 99 (31 Jan 2023 18:33) (99 - 99)  BP: 109/74 (31 Jan 2023 18:33) (109/74 - 109/74)  BP(mean): --  RR: 16 (31 Jan 2023 18:33) (16 - 16)  SpO2: 100% (31 Jan 2023 18:33) (100% - 100%)    Parameters below as of 31 Jan 2023 18:33  Patient On (Oxygen Delivery Method): room air        Physical exam:  Constitutional: No acute distress, conversant  Eyes: Anicteric sclerae, moist conjunctivae, see below for CNs  Neck: trachea midline, FROM, supple, no thyromegaly or lymphadenopathy  Cardiovascular: Regular rate and rhythm    Neurologic:  -Mental status: Awake, alert, oriented to person, age, and month. Speech is fluent with intact naming, repetition, and comprehension, slight dysarthria though speaking with trach in place. Follows commands.   -Cranial nerves:   II: No vision in right eye. Left eye full to confrontation of all visual fields  III, IV, VI: Extraocular movements are intact without nystagmus. Pupils equally round and reactive to light  V:  Facial sensation V1-V3 equal and intact   VII: Face appears symmetric  Motor: Normal bulk and tone. No pronator drift. Strength bilateral upper extremity 5/5 with questionable right finger curling/pulling on oxygen line, bilateral lower extremities 5/5.  Sensation: Intact to light touch bilaterally. No neglect or extinction on double simultaneous testing.  Coordination: No dysmetria on finger-to-nose    NIHSS: 3    Fingerstick Blood Glucose: CAPILLARY BLOOD GLUCOSE  84 (31 Jan 2023 18:53)      POCT Blood Glucose.: 84 mg/dL (31 Jan 2023 18:32)    LABS:                    RADIOLOGY & ADDITIONAL STUDIES:      -----------------------------------------------------------------------------------------------------------------  IV-tPA (Y/N):    ***                              Bolus time:    Alteplase Dose Verification w/ RN:  Reason IV-tPA not given: ***  
    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 Denies any other focal neurological sx. Ocular US done in ED, OU: no lens displacement, no pvd, no vitreous hemorrhage, no retinal detachment. SBP 130s. Initial imaging unremarkable. Loaded with DAPT.     PAST MEDICAL & SURGICAL HISTORY:      FAMILY HISTORY:      T(C): 36.6 (01-31-23 @ 19:45), Max: 36.6 (01-31-23 @ 19:45)  HR: 84 (01-31-23 @ 19:45) (76 - 99)  BP: 132/64 (01-31-23 @ 19:45) (109/74 - 133/74)  RR: 16 (01-31-23 @ 19:45) (16 - 20)  SpO2: 100% (01-31-23 @ 19:45) (100% - 100%)    MEDICATION RECONCILIATION   MEDICATIONS  (STANDING):  atorvastatin 80 milliGRAM(s) Oral at bedtime  enoxaparin Injectable 40 milliGRAM(s) SubCutaneous every 24 hours    MEDICATIONS  (PRN):    Allergies    No Known Allergies    Intolerances      Vital Signs Last 24 Hrs  T(C): 36.6 (31 Jan 2023 19:45), Max: 36.6 (31 Jan 2023 19:45)  T(F): 97.9 (31 Jan 2023 19:45), Max: 97.9 (31 Jan 2023 19:45)  HR: 84 (31 Jan 2023 19:45) (76 - 99)  BP: 132/64 (31 Jan 2023 19:45) (109/74 - 133/74)  BP(mean): --  RR: 16 (31 Jan 2023 19:45) (16 - 20)  SpO2: 100% (31 Jan 2023 19:45) (100% - 100%)    Parameters below as of 31 Jan 2023 19:45  Patient On (Oxygen Delivery Method): room air       (31 Jan 2023 20:31)      PAST MEDICAL & SURGICAL HISTORY:      MEDICATIONS  (STANDING):  aspirin enteric coated 81 milliGRAM(s) Oral daily  atorvastatin 80 milliGRAM(s) Oral at bedtime  clopidogrel Tablet 75 milliGRAM(s) Oral daily  enoxaparin Injectable 40 milliGRAM(s) SubCutaneous every 24 hours  influenza  Vaccine (HIGH DOSE) 0.7 milliLiter(s) IntraMuscular once    MEDICATIONS  (PRN):         FAMILY HISTORY:    CBC Full  -  ( 02 Feb 2023 05:52 )  WBC Count : 6.95 K/uL  RBC Count : 3.47 M/uL  Hemoglobin : 11.1 g/dL  Hematocrit : 34.0 %  Platelet Count - Automated : 246 K/uL  Mean Cell Volume : 98.0 fl  Mean Cell Hemoglobin : 32.0 pg  Mean Cell Hemoglobin Concentration : 32.6 gm/dL  Auto Neutrophil # : x  Auto Lymphocyte # : x  Auto Monocyte # : x  Auto Eosinophil # : x  Auto Basophil # : x  Auto Neutrophil % : x  Auto Lymphocyte % : x  Auto Monocyte % : x  Auto Eosinophil % : x  Auto Basophil % : x      02-02    138  |  101  |  21  ----------------------------<  98  3.9   |  27  |  1.21    Ca    9.7      02 Feb 2023 05:52  Phos  3.3     02-02  Mg     1.8     02-02    TPro  6.5  /  Alb  3.8  /  TBili  0.5  /  DBili  x   /  AST  25  /  ALT  30  /  AlkPhos  60  01-31            Radiology :     < from: MR Orbits w/wo IV Cont (02.01.23 @ 23:42) >    ACC: [id number] EXAM:  MR ORBITS ONLY [facility]IC   ORDERED BY: FRANCK GUEVARA     PROCEDURE DATE:  02/01/2023    ******PRELIMINARY REPORT******      ******PRELIMINARY REPORT******           INTERPRETATION:  [facility] RADIOLOGIST PRELIMINARY REPORT      Initial report created on 2/2/2023 12:05:24 AM EST  PROCEDURE INFORMATION:  Exam: MR Head Without and With Contrast  Exam date and time: 2/1/2023 9:56 PM  Age: 67 years old  Clinical indication: R/O crao w/ acute R eye vision loss    TECHNIQUE:  Imaging protocol: Magnetic resonance imaging of the head without and with  contrast.    COMPARISON:  CT HEAD STROKE PROTOCOL 1/31/2023 6:53 PM    FINDINGS:    Brain: Mild nonspecific T2/FLAIR hyperintensities of the periventricular   and  deep subcortical white matter, most likely secondary to chronic small   vessel  ischemic change. No intracranial hemorrhage or extra-axial fluid   collection. No  evidence of mass effect or midline shift. No restricted diffusion to   suggest  acute infarct. No abnormal intracranial enhancement.  Cerebral ventricles: Mild prominence of the ventricles and sulci, likely  attributed to parenchymal volume loss.  Bones/joints: Unremarkable.  Mastoid aircells: No mastoid effusion.  Soft tissues: Unremarkable.    IMPRESSION:  1. No acute intracranial pathology.  2. Chronic findings, as above.      < from: CT Brain Stroke Protocol (01.31.23 @ 19:07) >  ACC: [id number] EXAM:  CT BRAIN STROKE PROTOCOL   ORDERED BY: GERBER RENAE     PROCEDURE DATE:  01/31/2023          INTERPRETATION:  Stroke code.    Technique: CT head was performed utilizing axial images from the base of   the skull through the vertex without the administration of intravenous   contrast. Images were reviewed in the bone, brain and subdural windows.    Findings: There are no prior studies available for comparison.    There is no evidence of acute intracranial hemorrhage or acute   transcortical infarct. There is a small left frontal craniotomy. There   are scattered foci of low density within the periventricular white matter   consistent with mild microvascular disease. The ventricles are normal in   size and caliber.  There is no evidence of mass-effect or midline shift. There is no   evidence of an intra or extra-axial fluid collection.    Visualized paranasal sinuses and bilateral mastoid air cells are clear.    Impression: Minimal microvascular disease. No acute intracranial injury.      Vital Signs Last 24 Hrs  T(C): 36.9 (02 Feb 2023 05:17), Max: 37 (01 Feb 2023 20:25)  T(F): 98.4 (02 Feb 2023 05:17), Max: 98.6 (01 Feb 2023 20:25)  HR: 77 (02 Feb 2023 08:24) (67 - 89)  BP: 112/63 (02 Feb 2023 08:24) (99/77 - 136/74)  BP(mean): 82 (02 Feb 2023 08:24) (75 - 97)  RR: 18 (02 Feb 2023 08:24) (16 - 18)  SpO2: 99% (02 Feb 2023 08:24) (97% - 99%)    Parameters below as of 02 Feb 2023 08:24  Patient On (Oxygen Delivery Method): room air            REVIEW OF SYSTEMS:      CONSTITUTIONAL: No fever, weight loss, or fatigue  EYES: No eye pain, visual disturbances, or discharge  ENMT:  No difficulty hearing, tinnitus, vertigo; No sinus or throat pain  NECK: No pain or stiffness  BREASTS: No pain, masses, or nipple discharge  RESPIRATORY: No cough, wheezing, chills or hemoptysis; No shortness of breath  CARDIOVASCULAR: No chest pain, palpitations, dizziness, or leg swelling  GASTROINTESTINAL: No abdominal or epigastric pain. No nausea, vomiting, or hematemesis; No diarrhea or constipation. No melena or hematochezia.  GENITOURINARY: No dysuria, frequency, hematuria, or incontinence  NEUROLOGICAL: dec vision   SKIN: No itching, burning, rashes, or lesions   LYMPH NODES: No enlarged glands  ENDOCRINE: No heat or cold intolerance; No hair loss  MUSCULOSKELETAL: No joint pain or swelling; No muscle, back, or extremity pain  PSYCHIATRIC: No depression, anxiety, mood swings, or difficulty sleeping  HEME/LYMPH: No easy bruising, or bleeding gums  ALLERGY AND IMMUNOLOGIC: No hives or eczema  VASCULAR: no swelling , erythema          Physical Exam :  67 y o man  lying comfortably in semi Granados's position , awake , alert , no complaints talking with trach covered    Head : normocephalic , atraumatic    Eyes : PERRLA , EOMI , no nystagmus , sclera anicteric    ENT : nasal discharge , uvula midline , no oropharyngeal erythema / exudate    Neck : + trach , supple , negative JVD , negative carotid bruits , no thyromegaly    Chest : CTA bilaterally , neg wheeze / rhonchi / rales / crackles / egophany    Cardiovascular : regular rate and rhythm     Abdomen : soft , non distended , non tender to palpation in all 4 quadrants , normal bowel sounds     Extremities : WWP , neg cyanosis /clubbing / edema     :     Neurologic Exam :    Alert and oriented to person , place , date/year , trach /speech fluent w/o dysarthria     Cranial Nerves:     II :                         pupils equal , round and reactive to light , dec acuity OD no appreciable field cuts   III/ IV/VI :              extraocular movements intact , neg nystagmus , neg ptosis  V :                        facial sensation intact , V1-3 normal  VII :                      face symmetric , no droop , normal eye closure and smile  VIII :                     hearing intact to finger rub bilaterally  IX and X :             no hoarseness , gag intact , palate/ uvula rise symmetrically  XI :                       SCM / trapezius strength intact bilateral  XII :                      no tongue deviation    Motor Exam:          Right UE:           no focal weakness ,  > 4/5 throughout , negative pronator drift                               Left UE:             no focal weakness ,  > 4/5 throughout , negative pronator drift        Right LE:           no focal weakness ,  > 4/5 throughout    Left LE:             no focal weakness ,  > 4/5 throughout        Sensation :         intact to light touch x 4 extremities                          no neglect or extinction on double simultaneous testing                       DTR :                     biceps/brachioradialis : equal                                              patella/ankle : equal                                                                               neg Babinski          Coordination :      Finger to Nose :  neg dysmetria bilaterally      Gait :  not tested        PM&R Impression :     1) admitted for c/o visual loss  OD , probable cataract per Optho , no acute pathology on CT brain imaging, MRI orbits    2) no focal neurologic deficits       Recommendations / Plan :     1) Physical / Occupational therapy focusing on therapeutic exercises , equipment evaluation , bed mobility/transfer out of bed evaluation , progressive ambulation with assistive devices prn .    2) Current disposition plan recommendation  :  d/c home with no post discharge rehab needs
  OTOLARYNGOLOGY (ENT) CONSULTATION NOTE    PATIENT: LINDEN BAZZI     MRN: [id number]       : 55  DATE OF ADMISSION:23  DATE OF SERVICE:  23 @ 13:04    CHIEF COMPLAINT: Trach change     HISTORY OF PRESENT ILLNESS:  LINDEN BAZZI  is a 67y Belarusian speaking male, former heavy tobacco smoker (2PPD, 42py), throat ca s/p resection/trach to air (), ?HTN, no meds, coming in with a couple episodes of intermittent dizziness, worse with head movement and also complete right eye vision loss since 1:30 morning of . Denies any other focal neurological sx. Ocular US done in ED, OU: no lens displacement, no pvd, no vitreous hemorrhage, no retinal detachment. SBP 130s. Initial imaging unremarkable. Loaded with DAPT.  ENT consulted as patient mentioned he has not had a trach change in 7 years.  He states in [place] he underwent chemo/radiaion and a surgical dissection and tracheostomy in  for a form of throat cancer. Patient was unable to provide any additional information.  Patient denies any SOB, hemoptysis, or difficulty breathing. He states he gets discounted trach supplies and does not follow up with a provider.          CURRENT MEDICATIONS   aspirin enteric coated 81 Oral  atorvastatin 80 Oral  clopidogrel Tablet 75 Oral  enoxaparin Injectable 40 SubCutaneous  influenza  Vaccine (HIGH DOSE) 0.7 IntraMuscular      HOME MEDICATIONS:      ALLERGIES:  No Known Allergies    SOCIAL HISTORY: Pertinent included in HPI   FAMILY HISTORY    REVIEW OF SYSTEMS: The patient was asked and responded to a review of systems regarding the following symptoms: constitutional, eye, ears, nose, mouth, throat, cardiovascular, respiratory. Pertinent factors have been included in the HPI.     PHYSICAL EXAMINATION:    Constitutional: The patient's developmental and nutritional status was assessed.  The patient's voice quality was assessed.  Patient phonates with finger occlusion over trach   Head and Face: The head and face were inspected for deformities.  normal   Eyes: Extraocular movements and primary gaze alignment were assessed.  normal   Ears: The external ears were examined for deformities. normal   Nose: The nose was examined for external deformities.  normal   Oropharynx: The tongue, palate, tonsils, lateral and posterior pharynx were inspected for the presence of asymmetry or mucosal lesions.  normal   Neck: The tracheal position was noted- 6 cuffless fenestrated trach in place with no inner cannula. - patient changed to a 6 cuffless trach   Respiratory: The nature of the breathing and chest expansion/symmetry was observed.  normal     Vital Signs:  T(C): 36.5 (23 @ 09:27), Max: 37 (23 @ 20:25)  HR: 78 (23 @ 11:34) (67 - 89)  BP: 110/63 (23 @ 11:34) (100/62 - 136/74)  RR: 18 (23 @ 11:34) (16 - 18)  SpO2: 99% (23 @ 11:34) (97% - 99%)                        11.1   6.95  )-----------( 246      ( 2023 05:52 )             34.0    02-    138  |  101  |  21  ----------------------------<  98  3.9   |  27  |  1.21    Ca    9.7      2023 05:52  Phos  3.3     02-  Mg     1.8     -    TPro  6.5  /  Alb  3.8  /  TBili  0.5  /  DBili  x   /  AST  25  /  ALT  30  /  AlkPhos  60  -31   PT/INR - ( 2023 19:25 )   PT: 12.8 sec;   INR: 1.07          PTT - ( 2023 19:25 )  PTT:29.6 [id number]      PROCEDURE NOTE:     Procedure: Tracheostomy exchange      Pre-Procedure Diagnosis: Laryngeal cancer, tracheostomy dependence  Post-Procedure Diagnosis: Laryngeal cancer, tracheostomy dependence     Indications for Procedure:  is a Latvian JLUIS 67yMalewho presented with  a 6 FEN trach status post tracheostomy in 2017 . See above full HPI for further details. Patient has not had a tracheostomy change for 7 years.      Description of Procedure: After obtaining verbal consent, the patient was positioned with shoulder roll supine. Flexible suctioning was performed to clear the secretions. The existing 6 cuffless fenestrated tracheostomy was checked and ready with obturator  Next, the velcroe tie was removed and the tracheostomy tube was removed. The stoma was well healed. The tract appeared well formed without granulation or collapse. I did clear the skin with saline, and place an Allevyn dressing inferior to the stoma. I did replace the new 6UN75R  tracheostomy without difficulty and secured with velcroe trach tie. The patient tolerated the procedure well without complications.     Findings:  - Well formed stoma and tract  - Easy, uncomplicated placement of 6UN75R (removed 6 cuffless fenestrated tracheostomy tube)

## 2023-02-02 NOTE — SPEAKING VALVE EVALUATION - SLP PERTINENT HISTORY OF CURRENT PROBLEM
Former heavy tobacco smoker, hx of throat ca s/p XRT and resection/trach to air (2017). Presented with episodes of intermittent dizziness, worse with head movement and complete right eye vision loss since 6:30AM on 1/31. Optho consulted in ED for CRAO. Admitted for further stroke workup. MRI negative for acute intracranial pathology. [facility]

## 2023-02-02 NOTE — PROGRESS NOTE ADULT - REASON FOR ADMISSION
Complete right eye vision loss and intermittent dizziness
Complete right eye vision loss and intermittent dizziness

## 2023-02-02 NOTE — DISCHARGE NOTE PROVIDER - HOSPITAL COURSE
Hospital course:  67y [place]ian speaking male, former heavy tobacco smoker (2PPD, 42py), throat CA s/p resection/trach to air (2017), presents to [facility] complete right eye vision loss since 6:30 morning of 1/31. Initial NIHSS 3, HCT negative, CTA without steno-occlusive disease, was loaded with DAPT in ED. Pt admitted for possible CRAO and stroke workup. MRI brain and MRI orbits +/- contrast obtained and was negative for acute pathology. Opthalmology consulted, no evidence of CRAO on dilated fundoscopic exam, noted b/l cataracts OD >>OS and is likely the cause of his vision loss, recommended outpt follow up with cataract surgery OD then OS. ENT consulted for trach management as RN noted extensive pus around tracheostomy, trach replaced with reusable shiley. SLP consulted for passy rodney valve. Left elbow xray ordered as pt     During this hospital course, patient had a (ischemic/hemorrhagic) stroke located in (left/right.....) as seen on (MRI/CT).   The stroke etiology is likely secondary to:  []atrial fibrillation  []small vessel disease from atherosclerotic risk factors  []other:  []etiology workup still in progress    Patient had the following workup done in house:  CT Head:   MR Head Non Contrast:  CT Angio Head:  CT Angio Neck:  []echo  []labs  []other    Physical exam at discharge:    New medications on discharge:  Labs to be followed up:  Imaging to be done as outpatient:  Further outpatient workup:   Hospital course:  67y Macanese speaking male, former heavy tobacco smoker (2PPD, 42py), throat CA s/p resection/trach to air (2017), presents to [facility] complete right eye vision loss since 6:30 morning of 1/31. Initial NIHSS 3, HCT negative, CTA without steno-occlusive disease, was loaded with DAPT in ED. Pt admitted for possible CRAO and stroke workup. MRI brain and MRI orbits +/- contrast obtained and was negative for acute pathology. Opthalmology consulted, no evidence of CRAO on dilated fundoscopic exam, noted b/l cataracts OD >>OS and is likely the cause of his vision loss, recommended outpt follow up with cataract surgery OD then OS. ENT consulted for trach management as RN noted extensive pus around tracheostomy, trach replaced with reusable shiley. SLP consulted for passy rodney valve. Left elbow xray ordered as pt c/o point tenderness to left radial head after a fall. Pt pending medicaid insurance, will need to follow up with [facility] until insurance is through. Pt endorsing dizziness upon standing and ambulation, orthostatics negative, encouraged increasing salt in diet and fluids.     Patient had the following workup done in house:  CT Brain Stroke Protocol (01.31.23 @ 19:07)  Impression: Minimal microvascular disease. No acute intracranial injury.    CT Brain Perfusion Maps Stroke (01.31.23 @ 19:08)  IMPRESSION:  No evidence of CT perfusion deficit.    CT Angio Brain Stroke Protocol  w/ IV Cont (01.31.23 @ 19:09)   Impression: Normal CTA of the extracranial circulation. No evidence of   carotid stenosis.    MR Orbits w/wo IV Cont (02.01.23 @ 23:42)  Old left high parietal craniotomy without recurrent   mass or fluid collection. Remote left maxilla surgery is also present and   similar to January 2013 CT imaging with rounded cyst stable at 2.3 cm   diameter. No acute intracranial pathology. Chronic microvascular disease     Echocardiogram w/ Bubble and Doppler  CONCLUSIONS:   1. Normal left ventricular size and systolic function.   2. Normal right ventricular size and systolic function.   3. Normal atria.   4. Injection of agitated saline via a peripheral vein reveals no   evidence of a right-to-left shunt.   5. No significant valvular disease.   6. No pericardial effusion.   7. No prior echo is available for comparison.    Physical exam at discharge:  -Mental status: Awake and alert. Speech is fluent, slight dysarthria though speaking with trach in place. Follows commands.   -Cranial nerves:   II: Impaired vision in right eye. Left eye full to confrontation of all visual fields  III, IV, VI: Extraocular movements are intact without nystagmus. Pupils equally round and reactive to light  V:  Facial sensation V1-V3 equal and intact   VII: Face appears symmetric  Motor: Normal bulk and tone. No pronator drift. Strength bilateral upper extremity 5/5, bilateral lower extremities 5/5.  Sensation: Intact to light touch bilaterally. No neglect or extinction on double simultaneous testing.    New medications on discharge:   Labs to be followed up:  Imaging to be done as outpatient:  Further outpatient workup: ENT for cancer workup and possible decannulation, Opthalmology for cataract surgery removal.    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 complete right eye vision loss since 6:30 morning of 1/31. Initial NIHSS 3, HCT negative, CTA without steno-occlusive disease, was loaded with DAPT in ED. Pt admitted for possible CRAO and stroke workup. MRI brain and MRI orbits +/- contrast obtained and was negative for acute pathology. Opthalmology consulted, no evidence of CRAO on dilated fundoscopic exam, noted b/l cataracts OD >>OS and is likely the cause of his vision loss, recommended outpt follow up with cataract surgery OD then OS. ENT consulted for trach management as RN noted extensive pus around tracheostomy, trach replaced with reusable shiley. SLP consulted for passy rodney valve. Left elbow xray ordered as pt c/o point tenderness to left radial head after a fall. Pt pending medicaid insurance, will need to follow up with [facility] until insurance is through. Pt endorsing dizziness upon standing and ambulation, orthostatics negative, encouraged increasing salt in diet and fluids.     Patient had the following workup done in house:  CT Brain Stroke Protocol (01.31.23 @ 19:07)  Impression: Minimal microvascular disease. No acute intracranial injury.    CT Brain Perfusion Maps Stroke (01.31.23 @ 19:08)  IMPRESSION:  No evidence of CT perfusion deficit.    CT Angio Brain Stroke Protocol  w/ IV Cont (01.31.23 @ 19:09)   Impression: Normal CTA of the extracranial circulation. No evidence of   carotid stenosis.    MR Orbits w/wo IV Cont (02.01.23 @ 23:42)  Old left high parietal craniotomy without recurrent   mass or fluid collection. Remote left maxilla surgery is also present and   similar to January 2013 CT imaging with rounded cyst stable at 2.3 cm   diameter. No acute intracranial pathology. Chronic microvascular disease     Echocardiogram w/ Bubble and Doppler  CONCLUSIONS:   1. Normal left ventricular size and systolic function.   2. Normal right ventricular size and systolic function.   3. Normal atria.   4. Injection of agitated saline via a peripheral vein reveals no   evidence of a right-to-left shunt.   5. No significant valvular disease.   6. No pericardial effusion.   7. No prior echo is available for comparison.    Physical exam at discharge:  -Mental status: Awake and alert. Speech is fluent, slight dysarthria though speaking with trach in place. Follows commands.   -Cranial nerves:   II: Impaired vision in right eye. Left eye full to confrontation of all visual fields  III, IV, VI: Extraocular movements are intact without nystagmus. Pupils equally round and reactive to light  V:  Facial sensation V1-V3 equal and intact   VII: Face appears symmetric  Motor: Normal bulk and tone. No pronator drift. Strength bilateral upper extremity 5/5, bilateral lower extremities 5/5.  Sensation: Intact to light touch bilaterally. No neglect or extinction on double simultaneous testing.    New medications on discharge:   Labs to be followed up:  Imaging to be done as outpatient:  Further outpatient workup: ENT for cancer workup and possible decannulation, Ophthalmology for cataract surgery removal, optometrist/ophthalmology for new glasses prescription    Hospital course:  67y Lao speaking male, former heavy tobacco smoker (2PPD, 42py), throat CA s/p resection/trach to air (2017), presents to [facility] complete right eye vision loss since 6:30 morning of 1/31. Initial NIHSS 3, HCT negative, CTA without steno-occlusive disease, was loaded with DAPT in ED. Pt admitted for possible CRAO and stroke workup. MRI brain and MRI orbits +/- contrast obtained and was negative for acute pathology. Opthalmology consulted, no evidence of CRAO on dilated fundoscopic exam, noted b/l cataracts OD >>OS and is likely the cause of his vision loss, recommended outpt follow up with cataract surgery OD then OS. ENT consulted for trach management as RN noted extensive pus around tracheostomy, trach replaced with reusable shiley. SLP consulted for passy rodney valve. Left elbow xray ordered as pt c/o point tenderness to left radial head after a fall. Pt pending medicaid insurance, will need to follow up with [facility] until insurance is through. Pt endorsing dizziness upon standing and ambulation, orthostatics negative, encouraged increasing salt in diet and fluids. Pt is now stable for discharge home    Patient had the following workup done in house:  CT Brain Stroke Protocol (01.31.23 @ 19:07)  Impression: Minimal microvascular disease. No acute intracranial injury.    CT Brain Perfusion Maps Stroke (01.31.23 @ 19:08)  IMPRESSION:  No evidence of CT perfusion deficit.    CT Angio Brain Stroke Protocol  w/ IV Cont (01.31.23 @ 19:09)   Impression: Normal CTA of the extracranial circulation. No evidence of   carotid stenosis.    MR Orbits w/wo IV Cont (02.01.23 @ 23:42)  Old left high parietal craniotomy without recurrent   mass or fluid collection. Remote left maxilla surgery is also present and   similar to January 2013 CT imaging with rounded cyst stable at 2.3 cm   diameter. No acute intracranial pathology. Chronic microvascular disease     Echocardiogram w/ Bubble and Doppler  CONCLUSIONS:   1. Normal left ventricular size and systolic function.   2. Normal right ventricular size and systolic function.   3. Normal atria.   4. Injection of agitated saline via a peripheral vein reveals no   evidence of a right-to-left shunt.   5. No significant valvular disease.   6. No pericardial effusion.   7. No prior echo is available for comparison.    Physical exam at discharge:  -Mental status: Awake and alert. Speech is fluent, slight dysarthria though speaking with trach in place. Follows commands.   -Cranial nerves:   II: Impaired vision in right eye. Left eye full to confrontation of all visual fields  III, IV, VI: Extraocular movements are intact without nystagmus. Pupils equally round and reactive to light  V:  Facial sensation V1-V3 equal and intact   VII: Face appears symmetric  Motor: Normal bulk and tone. No pronator drift. Strength bilateral upper extremity 5/5, bilateral lower extremities 5/5.  Sensation: Intact to light touch bilaterally. No neglect or extinction on double simultaneous testing.    New medications on discharge:   Labs to be followed up:  Imaging to be done as outpatient:  Further outpatient workup: ENT for cancer workup and possible decannulation, Ophthalmology for cataract surgery removal, optometrist/ophthalmology for new glasses prescription    Hospital course:  67y [place] Islander speaking male, former heavy tobacco smoker (2PPD, 42py), throat CA s/p resection/trach to air (2017), presents to [facility] complete right eye vision loss since 6:30 morning of 1/31. Initial NIHSS 3, HCT negative, CTA without steno-occlusive disease, was loaded with DAPT in ED. Pt admitted for possible CRAO and stroke workup. MRI brain and MRI orbits +/- contrast obtained and was negative for acute pathology. Opthalmology consulted, no evidence of CRAO on dilated fundoscopic exam, noted b/l cataracts OD >>OS and is likely the cause of his vision loss, recommended outpt follow up with cataract surgery OD then OS. ENT consulted for trach management as RN noted extensive pus around tracheostomy, trach replaced with reusable shiley. SLP consulted for passy rodney valve. Left elbow xray ordered as pt c/o point tenderness to left radial head after a fall. Pt pending medicaid insurance, will need to follow up with [facility] until insurance is through. Pt endorsing dizziness upon standing and ambulation, orthostatics negative, encouraged increasing salt in diet and fluids. Pt is now stable for discharge home with outpatient ophthalmology follow up and vision therapy.    Patient had the following workup done in house:  CT Brain Stroke Protocol (01.31.23 @ 19:07)  Impression: Minimal microvascular disease. No acute intracranial injury.    CT Brain Perfusion Maps Stroke (01.31.23 @ 19:08)  IMPRESSION:  No evidence of CT perfusion deficit.    CT Angio Brain Stroke Protocol  w/ IV Cont (01.31.23 @ 19:09)   Impression: Normal CTA of the extracranial circulation. No evidence of   carotid stenosis.    MR Orbits w/wo IV Cont (02.01.23 @ 23:42)  Old left high parietal craniotomy without recurrent   mass or fluid collection. Remote left maxilla surgery is also present and   similar to January 2013 CT imaging with rounded cyst stable at 2.3 cm   diameter. No acute intracranial pathology. Chronic microvascular disease     Echocardiogram w/ Bubble and Doppler  CONCLUSIONS:   1. Normal left ventricular size and systolic function.   2. Normal right ventricular size and systolic function.   3. Normal atria.   4. Injection of agitated saline via a peripheral vein reveals no   evidence of a right-to-left shunt.   5. No significant valvular disease.   6. No pericardial effusion.   7. No prior echo is available for comparison.    Physical exam at discharge:  -Mental status: Awake and alert. Speech is fluent, slight dysarthria though speaking with trach in place. Follows commands.   -Cranial nerves:   II: Impaired vision in right eye. Left eye full to confrontation of all visual fields  III, IV, VI: Extraocular movements are intact without nystagmus. Pupils equally round and reactive to light  V:  Facial sensation V1-V3 equal and intact   VII: Face appears symmetric  Motor: Normal bulk and tone. No pronator drift. Strength bilateral upper extremity 5/5, bilateral lower extremities 5/5.  Sensation: Intact to light touch bilaterally. No neglect or extinction on double simultaneous testing.    New medications on discharge:   Labs to be followed up:  Imaging to be done as outpatient:  Further outpatient workup: ENT for cancer workup and possible decannulation, Ophthalmology for cataract surgery removal, optometrist/ophthalmology for new glasses prescription    Hospital course:  67y [place] speaking male, former heavy tobacco smoker (2PPD, 42py), throat CA s/p resection/trach to air (2017), presents to [facility] complete right eye vision loss since 6:30 morning of 1/31. Initial NIHSS 3, HCT negative, CTA without steno-occlusive disease, was loaded with DAPT in ED. Pt admitted for possible CRAO and stroke workup. MRI brain and MRI orbits +/- contrast obtained and was negative for acute pathology. Opthalmology consulted, no evidence of CRAO on dilated fundoscopic exam, noted b/l cataracts OD >>OS and is likely the cause of his vision loss, recommended outpt follow up with cataract surgery OD then OS. ENT consulted for trach management as RN noted extensive pus around tracheostomy, trach replaced with reusable shiley. SLP consulted for passy rodney valve. Left elbow xray ordered as pt c/o point tenderness to left radial head after a fall. Pt pending medicaid insurance, will need to follow up with [facility] until insurance is through. Pt endorsing dizziness upon standing and ambulation, orthostatics negative, encouraged increasing salt in diet and fluids. Pt is now stable for discharge home with outpatient ophthalmology follow up and vision therapy.  Discharge instructions reviewed with patient with  ID [id number]    Patient had the following workup done in house:  CT Brain Stroke Protocol (01.31.23 @ 19:07)  Impression: Minimal microvascular disease. No acute intracranial injury.    CT Brain Perfusion Maps Stroke (01.31.23 @ 19:08)  IMPRESSION:  No evidence of CT perfusion deficit.    CT Angio Brain Stroke Protocol  w/ IV Cont (01.31.23 @ 19:09)   Impression: Normal CTA of the extracranial circulation. No evidence of   carotid stenosis.    MR Orbits w/wo IV Cont (02.01.23 @ 23:42)  Old left high parietal craniotomy without recurrent   mass or fluid collection. Remote left maxilla surgery is also present and   similar to January 2013 CT imaging with rounded cyst stable at 2.3 cm   diameter. No acute intracranial pathology. Chronic microvascular disease     Echocardiogram w/ Bubble and Doppler  CONCLUSIONS:   1. Normal left ventricular size and systolic function.   2. Normal right ventricular size and systolic function.   3. Normal atria.   4. Injection of agitated saline via a peripheral vein reveals no   evidence of a right-to-left shunt.   5. No significant valvular disease.   6. No pericardial effusion.   7. No prior echo is available for comparison.    Physical exam at discharge:  -Mental status: Awake and alert. Speech is fluent, slight dysarthria though speaking with trach in place. Follows commands.   -Cranial nerves:   II: Impaired vision in right eye. Left eye full to confrontation of all visual fields  III, IV, VI: Extraocular movements are intact without nystagmus. Pupils equally round and reactive to light  V:  Facial sensation V1-V3 equal and intact   VII: Face appears symmetric  Motor: Normal bulk and tone. No pronator drift. Strength bilateral upper extremity 5/5, bilateral lower extremities 5/5.  Sensation: Intact to light touch bilaterally. No neglect or extinction on double simultaneous testing.    New medications on discharge:   Labs to be followed up:  Imaging to be done as outpatient:  Further outpatient workup: ENT for cancer workup and possible decannulation, Ophthalmology for cataract surgery removal, optometrist/ophthalmology for new glasses prescription

## 2023-02-02 NOTE — SPEAKING VALVE EVALUATION - RECOMMENDATIONS
Consider decannulation if medically appropriate given prolonged tolerance of decannulation cap. D/w neuro Ayesha CRUZ, and ENT Liset CRUZ.

## 2023-02-02 NOTE — SPEAKING VALVE EVALUATION - DIAGNOSTIC IMPRESSIONS
Pt tolerated decannulation cap, provided by ENT earlier this date, without any clinical s/s of cardiopulmonary distress. Vitals remained stable throughout duration of assessment.

## 2023-02-02 NOTE — SPEAKING VALVE EVALUATION - SLP GENERAL OBSERVATIONS
Received awake, sitting upright in bed, communicating with white trach cap in place. [place]-English interpretation provided via phone  [phone]. According to pt, he has been using red trach cap for years without difficulties (has not f/u with ENT in ~7 years). Basic conversational skills were WFL.  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

## 2023-02-02 NOTE — DISCHARGE NOTE PROVIDER - DETAILS OF MALNUTRITION DIAGNOSIS/DIAGNOSES
This patient has been assessed with a concern for Malnutrition and was treated during this hospitalization for the following Nutrition diagnosis/diagnoses:     -  02/03/2023: Mild protein-calorie malnutrition

## 2023-02-02 NOTE — PROGRESS NOTE ADULT - ASSESSMENT
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 Ocular US done in ED, OU: no lens displacement, no pvd, no vitreous hemorrhage, no retinal detachment. SBP 130s. Initial imaging unremarkable. Loaded with DAPT. NIHSS 3. OOW for  tenecteplase. No LVO on CTA. Optho consulted in ED for CRAO. Admitted for further stroke workup.     Neuro  #CVA workup  - s/p dapt load  - continue aspirin 81mg and plavix 75mg daily  - continue atorvastatin 80mg daily  - q4hr stroke neuro checks and vitals  - obtain MRI Brain without contrast  - Stroke Code HCT Results: neg  - Stroke Code CTA Results: neg  - Stroke education  - ESR 10  - Ophthalmology consult: MRI head and orbits with and without contrast; outpatient follow-up with his ophthalmologist or with [facility] Department of Ophthalmology   [facility] [address] [address]      Cards  #HTN  - blood pressure 99/77 this morning, will monitor  - permissive hypertension, Goal -180  - hold home blood pressure medication for now  - TTE with bubble - normal  - Stroke Code EKG Results:   · EKG Date/Time: 31-Jan-2023 18:34  · Rate: 92  · Axis: Normal  · IA: 140  · QRS: 74  · QTC: 437  · ST/T Wave: no st/t changes    #HLD  - high dose statin as above in CVA  - LDL results: 77    Pulm  - call provider if SPO2 < 94%  #Tracheostomy  - throat cancer resection 7 years ago in [facility]; no further details known by patient and hospital has since closed; he will ask sister for more information    GI  #Nutrition/Fluids/Electrolytes   - replete K<4 and Mg <2  - Diet: reg once passed dysphagia  - IVF: none indicated at this time    Renal  - trend BMP    Infectious Disease  - Stroke Code CXR results:  No evidence of acute cardiopulmonary disease. Incidentally seen is a small opacification with some retraction in the right upper lobe, likely mucoid impaction. Further evaluation on a nonemergent basis with chest CT may be pursued if clinically warranted.    Endocrine  - A1C results: 5.0  - TSH results: 3.970    DVT Prophylaxis  - lovenox sq for DVT prophylaxis   - SCDs for DVT prophylaxis       Dispo: admit to stroke tele, PT/OT/Social work/Speech eval pending; OT recommends outpatient OT/visual therapy     Discussed daily hospital plans and goals with patient at bedside.    Discussed with stroke fellow Dr. Mejias who discussed with neurology attending Dr. 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 Ocular US done in ED, OU: no lens displacement, no pvd, no vitreous hemorrhage, no retinal detachment. SBP 130s. Initial imaging unremarkable. Loaded with DAPT. NIHSS 3. OOW for  tenecteplase. No LVO on CTA. Optho consulted in ED for CRAO. Admitted for further stroke workup. MRI negative for acute intracranial pathology. Continued management for visual impairment by ophthalmology and trach maintenance by ENT.     Neuro  #CVA workup  - reporting left elbow pain, obtain left elbow x-ray  - s/p dapt load  - continue aspirin 81mg and plavix 75mg daily  - continue atorvastatin 80mg daily  - q4h stroke neuro checks and vitals  - MRI Brain and orbits with and without contrast: No acute intracranial pathology; Chronic findings, as described above.  - Stroke Code HCT Results: neg  - Stroke Code CTA Results: neg  - Stroke education  - ESR 10  - Ophthalmology consult: MRI head and orbits with and without contrast completed; outpatient follow-up with his ophthalmologist or with [facility] Department of Ophthalmology, likely cataract surgery  [facility] [address] [address]      Cards  #HTN  - dizziness upon standing, obtain orthostatics  - permissive hypertension, Goal -180  - hold home blood pressure medication for now  - TTE with bubble - normal  - Stroke Code EKG Results:   · EKG Date/Time: 31-Jan-2023 18:34  · Rate: 92  · Axis: Normal  · AL: 140  · QRS: 74  · QTC: 437  · ST/T Wave: no st/t changes    #HLD  - high dose statin as above in CVA  - LDL results: 77    Pulm  - call provider if SPO2 < 94%  #Tracheostomy  - throat cancer resection 7 years ago in [place]; no further details known by patient and hospital has since closed; he will ask sister for more information  - trach changed by ENT, supplies provided    GI  #Nutrition/Fluids/Electrolytes   - replete K<4 and Mg <2  - Diet: reg once passed dysphagia  - IVF: none indicated at this time    Renal  - trend BMP    Infectious Disease  - Stroke Code CXR results:  No evidence of acute cardiopulmonary disease. Incidentally seen is a small opacification with some retraction in the right upper lobe, likely mucoid impaction. Further evaluation on a nonemergent basis with chest CT may be pursued if clinically warranted.    Endocrine  - A1C results: 5.0  - TSH results: 3.970    DVT Prophylaxis  - lovenox sq for DVT prophylaxis   - SCDs for DVT prophylaxis       Dispo: admit to stroke tele, PT/OT/Social work/Speech eval pending; OT recommends outpatient OT/visual therapy     Discussed daily hospital plans and goals with patient at bedside.    Discussed with stroke fellow Dr. Mejias who discussed with neurology attending Dr. 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 Ocular US done in ED, OU: no lens displacement, no pvd, no vitreous hemorrhage, no retinal detachment. SBP 130s. Initial imaging unremarkable. Loaded with DAPT. NIHSS 3. OOW for  tenecteplase. No LVO on CTA. Optho consulted in ED for CRAO. Admitted for further stroke workup. MRI negative for acute intracranial pathology. Continued management for visual impairment by ophthalmology and trach maintenance by ENT.     Neuro  #CVA workup  - reporting left elbow pain, obtain left elbow x-ray  - s/p dapt load  - continue aspirin 81mg and plavix 75mg daily  - continue atorvastatin 80mg daily  - q4h stroke neuro checks and vitals  - MRI Brain and orbits with and without contrast: No acute intracranial pathology; Chronic findings, as described above.  - Stroke Code HCT Results: neg  - Stroke Code CTA Results: neg  - Stroke education  - ESR 10  - Ophthalmology consult: MRI head and orbits with and without contrast completed; outpatient follow-up with his ophthalmologist or with [facility] Department of Ophthalmology, likely cataract surgery  [facility] [address] [address]      Cards  #HTN  - dizziness upon standing, obtain orthostatics  - permissive hypertension, Goal -180  - hold home blood pressure medication for now  - TTE with bubble - normal  - Stroke Code EKG Results:   · EKG Date/Time: 31-Jan-2023 18:34  · Rate: 92  · Axis: Normal  · NY: 140  · QRS: 74  · QTC: 437  · ST/T Wave: no st/t changes    #HLD  - high dose statin as above in CVA  - LDL results: 77    Pulm  - call provider if SPO2 < 94%  #Tracheostomy  - throat cancer resection 7 years ago in [place]; no further details known by patient and hospital has since closed; he will ask sister for more information  - ENT removed 6 cuffless fenestrated trach and placed 6UN75R, recommended nursing education on tracheostomy care, PMV for talking, and follow up with an ENT for routine care     GI  #Nutrition/Fluids/Electrolytes   - replete K<4 and Mg <2  - Diet: reg once passed dysphagia  - IVF: none indicated at this time    Renal  - trend BMP    Infectious Disease  - Stroke Code CXR results:  No evidence of acute cardiopulmonary disease. Incidentally seen is a small opacification with some retraction in the right upper lobe, likely mucoid impaction. Further evaluation on a nonemergent basis with chest CT may be pursued if clinically warranted.    Endocrine  - A1C results: 5.0  - TSH results: 3.970    DVT Prophylaxis  - lovenox sq for DVT prophylaxis   - SCDs for DVT prophylaxis       Dispo: admit to stroke tele, PT/OT/Social work/Speech eval pending; OT recommends outpatient OT/visual therapy     Discussed daily hospital plans and goals with patient at bedside.    Discussed with stroke fellow Dr. Mejias who discussed with neurology attending Dr. Gusman

## 2023-02-02 NOTE — DISCHARGE NOTE PROVIDER - NSDCFUADDINST_GEN_ALL_CORE_FT
avoid driving until you are able to see an ophthalmologist or optometrist to provide you a new glasses prescription.

## 2023-02-02 NOTE — PROGRESS NOTE ADULT - SUBJECTIVE AND OBJECTIVE BOX
Neurology Stroke Progress Note     ID# [id number]    Interval History:  Patient seen and examined at bedside. No acute events overnight. MRI brain and orbits obtained, negative for acute infarct. Patient feeling well but reports continued loss of vision in right eye and about 60% improvement left eye. No eye pain. Continues to have dizziness with standing up quickly. No headache or weakness.     Physical Exam:  Neurologic:  -Mental status: Awake and alert. Speech is fluent, slight dysarthria though speaking with trach in place. Follows commands.   -Cranial nerves:   II: Impaired vision in right eye. Left eye full to confrontation of all visual fields  III, IV, VI: Extraocular movements are intact without nystagmus. Pupils equally round and reactive to light  V:  Facial sensation V1-V3 equal and intact   VII: Face appears symmetric  Motor: Normal bulk and tone. No pronator drift. Strength bilateral upper extremity 5/5, bilateral lower extremities 5/5.  Sensation: Intact to light touch bilaterally. No neglect or extinction on double simultaneous testing.      Vital Signs Last 24 Hrs  T(C): 36.5 (02 Feb 2023 09:27), Max: 37 (01 Feb 2023 20:25)  T(F): 97.7 (02 Feb 2023 09:27), Max: 98.6 (01 Feb 2023 20:25)  HR: 78 (02 Feb 2023 11:34) (67 - 89)  BP: 110/63 (02 Feb 2023 11:34) (100/62 - 136/74)  BP(mean): 79 (02 Feb 2023 11:34) (75 - 97)  RR: 18 (02 Feb 2023 11:34) (16 - 18)  SpO2: 99% (02 Feb 2023 11:34) (97% - 99%)    Parameters below as of 02 Feb 2023 11:34  Patient On (Oxygen Delivery Method): room air    LABS:                        11.1   6.95  )-----------( 246      ( 02 Feb 2023 05:52 )             34.0     02-02    138  |  101  |  21  ----------------------------<  98  3.9   |  27  |  1.21    Ca    9.7      02 Feb 2023 05:52  Phos  3.3     02-02  Mg     1.8     02-02    TPro  6.5  /  Alb  3.8  /  TBili  0.5  /  DBili  x   /  AST  25  /  ALT  30  /  AlkPhos  60  01-31    PT/INR - ( 31 Jan 2023 19:25 )   PT: 12.8 sec;   INR: 1.07          PTT - ( 31 Jan 2023 19:25 )  PTT:29.6 sec      MEDICATIONS  (STANDING):  aspirin enteric coated 81 milliGRAM(s) Oral daily  atorvastatin 80 milliGRAM(s) Oral at bedtime  clopidogrel Tablet 75 milliGRAM(s) Oral daily  enoxaparin Injectable 40 milliGRAM(s) SubCutaneous every 24 hours  influenza  Vaccine (HIGH DOSE) 0.7 milliLiter(s) IntraMuscular once    MEDICATIONS  (PRN):      Allergies    No Known Allergies    Intolerances      RADIOLOGY & ADDITIONAL TESTS:   Neurology Stroke Progress Note     ID# [id number]    Interval History:  Patient seen and examined at bedside. No acute events overnight. MRI brain and orbits obtained, negative for acute infarct. Patient feeling well but reports continued loss of vision in right eye and about 60% improvement left eye. No eye pain. Continues to have dizziness with standing up quickly. No headache or weakness. Reporting left elbow pain.    Physical Exam:  Neurologic:  -Mental status: Awake, alert, and oriented to person, place, and time. Speech is fluent, slight dysarthria though speaking with trach in place. Follows commands.   -Cranial nerves:   II: Impaired peripheral vision in right eye. Left eye full to confrontation of all visual fields  III, IV, VI: Extraocular movements are intact without nystagmus. Pupils equally round and reactive to light  V:  Facial sensation V1-V3 equal and intact   VII: Face appears symmetric with normal smile.  XI: Shoulder shrug intact.  XII: Tongue protrudes midline.   Motor: Normal bulk and tone. No pronator drift. Strength bilateral upper extremity 5/5, bilateral lower extremities 5/5.  Sensation: Intact to light touch bilaterally. No neglect or extinction on double simultaneous testing.        Vital Signs Last 24 Hrs  T(C): 36.5 (02 Feb 2023 09:27), Max: 37 (01 Feb 2023 20:25)  T(F): 97.7 (02 Feb 2023 09:27), Max: 98.6 (01 Feb 2023 20:25)  HR: 78 (02 Feb 2023 11:34) (67 - 89)  BP: 110/63 (02 Feb 2023 11:34) (100/62 - 136/74)  BP(mean): 79 (02 Feb 2023 11:34) (75 - 97)  RR: 18 (02 Feb 2023 11:34) (16 - 18)  SpO2: 99% (02 Feb 2023 11:34) (97% - 99%)    Parameters below as of 02 Feb 2023 11:34  Patient On (Oxygen Delivery Method): room air    LABS:                        11.1   6.95  )-----------( 246      ( 02 Feb 2023 05:52 )             34.0     02-02    138  |  101  |  21  ----------------------------<  98  3.9   |  27  |  1.21    Ca    9.7      02 Feb 2023 05:52  Phos  3.3     02-02  Mg     1.8     02-02    TPro  6.5  /  Alb  3.8  /  TBili  0.5  /  DBili  x   /  AST  25  /  ALT  30  /  AlkPhos  60  01-31    PT/INR - ( 31 Jan 2023 19:25 )   PT: 12.8 sec;   INR: 1.07          PTT - ( 31 Jan 2023 19:25 )  PTT:29.6 sec      MEDICATIONS  (STANDING):  aspirin enteric coated 81 milliGRAM(s) Oral daily  atorvastatin 80 milliGRAM(s) Oral at bedtime  clopidogrel Tablet 75 milliGRAM(s) Oral daily  enoxaparin Injectable 40 milliGRAM(s) SubCutaneous every 24 hours  influenza  Vaccine (HIGH DOSE) 0.7 milliLiter(s) IntraMuscular once    MEDICATIONS  (PRN):      Allergies    No Known Allergies    Intolerances      RADIOLOGY & ADDITIONAL TESTS:    Initial report created on 2/2/2023 12:05:24 AM EST  PROCEDURE INFORMATION:  Exam: MR Head Without and With Contrast  Exam date and time: 2/1/2023 9:56 PM  Age: 67 years old  Clinical indication: R/O crao w/ acute R eye vision loss    TECHNIQUE:  Imaging protocol: Magnetic resonance imaging of the head without and with  contrast.    COMPARISON:  CT HEAD STROKE PROTOCOL 1/31/2023 6:53 PM    FINDINGS:    Brain: Mild nonspecific T2/FLAIR hyperintensities of the periventricular   and  deep subcortical white matter, most likely secondary to chronic small   vessel  ischemic change. No intracranial hemorrhage or extra-axial fluid   collection. No  evidence of mass effect or midline shift. No restricted diffusion to   suggest  acute infarct. No abnormal intracranial enhancement.  Cerebral ventricles: Mild prominence of the ventricles and sulci, likely  attributed to parenchymal volume loss.  Bones/joints: Unremarkable.  Mastoid air cells: No mastoid effusion.  Soft tissues: Unremarkable.    IMPRESSION:  1. No acute intracranial pathology.  2. Chronic findings, as above.    INTERPRETATION:  Magnus JOHN MD, have reviewed the images;   agree with the preliminary reported findings.      Central retinal artery occlusion cannot be ruled in or ruled out on MRI.   No focal abnormality detected O.D.    Additional findings: Old left high parietal craniotomy without recurrent   mass or fluid collection. Remote left maxilla surgery is also present and   similar to January 2013 CT imaging with rounded cyst stable at 2.3 cm   diameter.    Contrast: 7 cc Gadavist given IV

## 2023-02-02 NOTE — DISCHARGE NOTE PROVIDER - PROVIDER TOKENS
FREE:[LAST:[Baez],FIRST:[Aidan],PHONE:[phone]],FAX:[(   )    -],ADDRESS:[[facility]/[place] [address]],FOLLOWUP:[2 weeks]],FREE:[LAST:[Carlos],FIRST:[Janes],PHONE:[phone]],FAX:[(   )    -],ADDRESS:[[facility]/[place] [address]],FOLLOWUP:[2 weeks]] FREE:[LAST:[Arunali],FIRST:[Janes],PHONE:[phone]],FAX:[(   )    -],ADDRESS:[[place] [address]],FOLLOWUP:[2 weeks]],FREE:[LAST:[Lisa],FIRST:[Rosalba Mcconnell],PHONE:[(   )    -],FAX:[(   )    -],ADDRESS:[[place] [address]],FOLLOWUP:[2 weeks]]

## 2023-02-03 LAB
ANION GAP SERPL CALC-SCNC: 8 MMOL/L — SIGNIFICANT CHANGE UP (ref 5–17)
BUN SERPL-MCNC: 21 MG/DL — SIGNIFICANT CHANGE UP (ref 7–23)
CALCIUM SERPL-MCNC: 9.9 MG/DL — SIGNIFICANT CHANGE UP (ref 8.4–10.5)
CHLORIDE SERPL-SCNC: 100 MMOL/L — SIGNIFICANT CHANGE UP (ref 96–108)
CO2 SERPL-SCNC: 26 MMOL/L — SIGNIFICANT CHANGE UP (ref 22–31)
CREAT SERPL-MCNC: 1.19 MG/DL — SIGNIFICANT CHANGE UP (ref 0.5–1.3)
EGFR: 67 ML/MIN/1.73M2 — SIGNIFICANT CHANGE UP
GLUCOSE SERPL-MCNC: 89 MG/DL — SIGNIFICANT CHANGE UP (ref 70–99)
HCT VFR BLD CALC: 37.2 % — LOW (ref 39–50)
HGB BLD-MCNC: 12.2 G/DL — LOW (ref 13–17)
MAGNESIUM SERPL-MCNC: 1.8 MG/DL — SIGNIFICANT CHANGE UP (ref 1.6–2.6)
MCHC RBC-ENTMCNC: 31.5 PG — SIGNIFICANT CHANGE UP (ref 27–34)
MCHC RBC-ENTMCNC: 32.8 GM/DL — SIGNIFICANT CHANGE UP (ref 32–36)
MCV RBC AUTO: 96.1 FL — SIGNIFICANT CHANGE UP (ref 80–100)
NRBC # BLD: 0 /100 WBCS — SIGNIFICANT CHANGE UP (ref 0–0)
PHOSPHATE SERPL-MCNC: 3.3 MG/DL — SIGNIFICANT CHANGE UP (ref 2.5–4.5)
PLATELET # BLD AUTO: 236 K/UL — SIGNIFICANT CHANGE UP (ref 150–400)
POTASSIUM SERPL-MCNC: 4 MMOL/L — SIGNIFICANT CHANGE UP (ref 3.5–5.3)
POTASSIUM SERPL-SCNC: 4 MMOL/L — SIGNIFICANT CHANGE UP (ref 3.5–5.3)
RBC # BLD: 3.87 M/UL — LOW (ref 4.2–5.8)
RBC # FLD: 12.5 % — SIGNIFICANT CHANGE UP (ref 10.3–14.5)
SODIUM SERPL-SCNC: 134 MMOL/L — LOW (ref 135–145)
WBC # BLD: 6.08 K/UL — SIGNIFICANT CHANGE UP (ref 3.8–10.5)
WBC # FLD AUTO: 6.08 K/UL — SIGNIFICANT CHANGE UP (ref 3.8–10.5)

## 2023-02-03 PROCEDURE — 31502 CHANGE OF WINDPIPE AIRWAY: CPT

## 2023-02-03 PROCEDURE — 99232 SBSQ HOSP IP/OBS MODERATE 35: CPT

## 2023-02-03 PROCEDURE — 99233 SBSQ HOSP IP/OBS HIGH 50: CPT

## 2023-02-03 RX ORDER — BENZOCAINE AND MENTHOL 5; 1 G/100ML; G/100ML
1 LIQUID ORAL
Refills: 0 | Status: DISCONTINUED | OUTPATIENT
Start: 2023-02-03 | End: 2023-02-04

## 2023-02-03 RX ADMIN — BENZOCAINE AND MENTHOL 1 LOZENGE: 5; 1 LIQUID ORAL at 17:30

## 2023-02-03 RX ADMIN — ENOXAPARIN SODIUM 40 MILLIGRAM(S): 100 INJECTION SUBCUTANEOUS at 21:47

## 2023-02-03 RX ADMIN — Medication 1 DROP(S): at 19:12

## 2023-02-03 NOTE — PROCEDURE NOTE - ADDITIONAL PROCEDURE DETAILS
PROCEDURE NOTE:     Procedure: Tracheostomy exchange      Pre-Procedure Diagnosis: Laryngeal cancer, tracheostomy dependence  Post-Procedure Diagnosis: Laryngeal cancer, tracheostomy dependence     Indications for Procedure:  is a [place]ian JLUIS 67yMalewho presented with  a 6 FEN trach status post tracheostomy in 2017 . See above full HPI for further details. Patient has not had a tracheostomy change for 7 years.  Patient changed to a 6 cuffless yesterday and noted to have airleak around the trach leading to a breathy voice. Patient will be changed to a 8UN85R      Description of Procedure: After obtaining verbal consent, the patient was positioned with shoulder roll supine. Flexible suctioning was performed to clear the secretions. The existing 6 cuffless tracheostomy was checked and ready with obturator  Next, the velcroe tie was removed and the tracheostomy tube was removed. The stoma was well healed. The tract appeared well formed without granulation or collapse. I did clear the skin with saline, and place an Allevyn dressing inferior to the stoma. I did replace the new 8UN85R  tracheostomy without difficulty and secured with velcroe trach tie. The patient tolerated the procedure well without complications.     Findings:  - Well formed stoma and tract  - Easy, uncomplicated placement of 8UN85R (removed 6 cuffless tracheostomy tube)

## 2023-02-03 NOTE — PROGRESS NOTE ADULT - SUBJECTIVE AND OBJECTIVE BOX
INTERVAL HPI/OVERNIGHT EVENTS: GIBSON O/N    SUBJECTIVE: Patient seen and examined at bedside.   Sudanese phone  used for visit  Pt reports some dry eyes bilaterally. Also reports that he lost his tracheostomy speaking valve yesterday after coughing very hard - now using his finger to plug his trach in order to speak.   Otherwise ambulating - states dizziness/lightheadedness improved. No chest pain, dyspnea, N/V/Abd pain. Voiding wo dysuria.    OBJECTIVE:    VITAL SIGNS:  ICU Vital Signs Last 24 Hrs  T(C): 36.8 (03 Feb 2023 09:55), Max: 36.8 (03 Feb 2023 05:05)  T(F): 98.2 (03 Feb 2023 09:55), Max: 98.2 (03 Feb 2023 05:05)  HR: 84 (03 Feb 2023 09:27) (68 - 84)  BP: 108/63 (03 Feb 2023 09:27) (94/57 - 132/82)  BP(mean): 80 (03 Feb 2023 09:27) (68 - 102)  ABP: --  ABP(mean): --  RR: 17 (03 Feb 2023 09:27) (14 - 18)  SpO2: 99% (03 Feb 2023 09:27) (97% - 100%)    O2 Parameters below as of 03 Feb 2023 09:27  Patient On (Oxygen Delivery Method): room air              02-02 @ 07:01 - 02-03 @ 07:00  --------------------------------------------------------  IN: 525 mL / OUT: 1275 mL / NET: -750 mL    02-03 @ 07:01  -  02-03 @ 10:48  --------------------------------------------------------  IN: 200 mL / OUT: 100 mL / NET: 100 mL      CAPILLARY BLOOD GLUCOSE          PHYSICAL EXAM:  GEN: Male in NAD on RA  HEENT: NC/AT, MMM, Cataracts noted in both eyes  Tracheostomy in place   CV: RRR, nml S1S2, no murmurs  PULM: nml effort, CTAB  ABD: Soft, non-distended, NABS, non-tender  NEURO  A/O x3,   Decreased vision in R eye. EOM limited during far lateral gaze on R side.  No facial droop on change in sensation  5/5 in BUE, BLE,   PSYCH: Appropriate    MEDICATIONS:  MEDICATIONS  (STANDING):  enoxaparin Injectable 40 milliGRAM(s) SubCutaneous every 24 hours  influenza  Vaccine (HIGH DOSE) 0.7 milliLiter(s) IntraMuscular once    MEDICATIONS  (PRN):      ALLERGIES:  Allergies    No Known Allergies    Intolerances        LABS:                        11.1   6.95  )-----------( 246      ( 02 Feb 2023 05:52 )             34.0     02-02    138  |  101  |  21  ----------------------------<  98  3.9   |  27  |  1.21    Ca    9.7      02 Feb 2023 05:52  Phos  3.3     02-02  Mg     1.8     02-02            RADIOLOGY & ADDITIONAL TESTS: Reviewed.

## 2023-02-03 NOTE — PROGRESS NOTE ADULT - TIME BILLING
Review of chart information, interpretation of data, evaluation of patient, coordination of care.
Review of chart information, interpretation of data, evaluation of patient, coordination of care.

## 2023-02-03 NOTE — DIETITIAN INITIAL EVALUATION ADULT - OTHER INFO
67M Burkinan speaker, former smoker, h/o throat CA s/p resection w tracheostomy w speaking valve in [place] (2017), b/l cataracts, p/w dizziness, loss of vision in R eye since 1/31 and red flashes in L eye, admitted for CVA evaluation, also seen by ophthalmology, s/p trach change 2/2 by ENT - for Home w HPT    Pt seen at bedside for initial assessment-trach to room air at this time. RD able to communicate in English and Burkinan, however, pt w/ limited ability to speak due to pain at trach site thus limited information obtained. Denies nausea/vomiting at this time. No BM documented since admission. Confirms NKFA. Unable to obtain diet recall PTA at this time. Pt unaware of current body weight, however, notes he has lost weight (dosing weight 112 pounds). No weight history in EMR. No edema documented at this time. No pressure ulcers documented at this time. David score=21. Labs reviewed 2/3; electrolytes within normal limits at this time. Observed pt w/ mild wasting in temple and tricep regions. Based on ASPEN guidelines, pt meets criteria for mild malnutrition. Current diet order easy to chew DASH/TLC; pt awaiting breakfast at time of assessment thus unable to assess PO intake at this time. RD offered trial of Ensure if unable to meet EER via PO; willing to trial. Made aware RD remains available. RD to follow up. See nutrition recommendations below.

## 2023-02-03 NOTE — PROGRESS NOTE ADULT - ASSESSMENT
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 Ocular US done in ED, OU: no lens displacement, no pvd, no vitreous hemorrhage, no retinal detachment. SBP 130s. Initial imaging unremarkable. Loaded with DAPT. NIHSS 3. OOW for tenecteplase. No LVO on CTA. Optho consulted in ED for CRAO. Admitted for further stroke workup. MRI negative for acute intracranial pathology. Visual loss due to cataracts OD>OS per optho, will require outpt cataract surgery. ENT consulted for trach mgmt, trach replaced on 2/2.     Neuro  #CVA workup - negative for acute infarct  - s/p dapt load  - dc'ed DAPT and high-intensity statin   - q4h stroke neuro checks and vitals  - MRI Brain and orbits with and without contrast: No acute intracranial pathology; Chronic findings, as described above.  - Stroke Code HCT Results: neg  - Stroke Code CTA Results: neg  - Stroke education  - ESR 10    #CRAO workup  - Ophthalmology consult on 2/1 - no evidence of CRAO on fundoscopic exam, visual loss likely due to cataracts OD>>OS, will require outpt cataract surgery    Cards  #hypotension  - BP goal normotension   - TTE with bubble - normal  - Stroke Code EKG Results: NSR  - dizziness when standing, orthostatic vitals negative but BP does lower upon standing - rec increased hydration and salt intake    #HLD  - LDL results: 77    Pulm  - call provider if SPO2 < 94%    #Tracheostomy  - throat cancer resection 7 years ago in [facility]; no further details known by patient and hospital has since closed; he will ask sister for more information  - ENT removed 6 cuffless fenestrated trach and placed [id number], recommended nursing education on tracheostomy care, PMV for talking, and follow up with an ENT for routine care   - SLP consulted for passy rodney valve 2/2  - s/p trach replacement pt c/o difficulty speaking and needing to stop mid-sentence, SLP states it may be problem with stoma, will appreciate ENT recs     GI  #Nutrition/Fluids/Electrolytes   - replete K<4 and Mg <2  - Diet: regular   - IVF: none indicated at this time    Renal  - trend BMP    Infectious Disease  - Stroke Code CXR results:  No evidence of acute cardiopulmonary disease. Incidentally seen is a small opacification with some retraction in the right upper lobe, likely mucoid impaction. Further evaluation on a nonemergent basis with chest CT may be pursued if clinically warranted.    Endocrine  - A1C results: 5.0  - TSH results: 3.970    DVT Prophylaxis  - lovenox sq for DVT prophylaxis   - SCDs for DVT prophylaxis     Dispo: outpatient OT/visual therapy, no needs for PT, [place]     Discussed daily hospital plans and goals with patient at bedside.    Discussed with Neurology attending Dr. 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 Ocular US done in ED, OU: no lens displacement, no pvd, no vitreous hemorrhage, no retinal detachment. SBP 130s. Initial imaging unremarkable. Loaded with DAPT. NIHSS 3. OOW for tenecteplase. No LVO on CTA. Optho consulted in ED for CRAO. Admitted for further stroke workup. MRI negative for acute intracranial pathology. Visual loss due to cataracts OD>OS per optho, will require outpt cataract surgery. ENT consulted for trach mgmt, trach replaced on 2/2, pt with difficulty speaking, ENT reconsulted on 2/3 and replaced trach to larger size, pt tolerating ok. Plan for discharge home on 2/4.     Neuro  #CVA workup - negative for acute infarct  - s/p dapt load  - dc'ed DAPT and high-intensity statin   - MRI Brain and orbits with and without contrast: No acute intracranial pathology; Chronic findings, as described above.  - Stroke Code HCT Results: neg  - Stroke Code CTA Results: neg  - Stroke education  - ESR 10    #CRAO workup  - Ophthalmology consult on 2/1 - no evidence of CRAO on fundoscopic exam, visual loss likely due to cataracts OD>>OS, will require outpt cataract surgery    Cards  #hypotension  - BP goal normotension   - TTE with bubble - normal  - Stroke Code EKG Results: NSR  - dizziness when standing, orthostatic vitals negative but BP does lower upon standing - rec increased hydration and salt intake    #HLD  - LDL results: 77    Pulm  - call provider if SPO2 < 94%    #Tracheostomy  - throat cancer resection 7 years ago in [facility]; no further details known by patient and hospital has since closed; he will ask sister for more information  - ENT removed 6 cuffless fenestrated trach and placed [id number], recommended nursing education on tracheostomy care, PMV for talking, and follow up with an ENT for routine care   - SLP consulted for passy rodney valve 2/2  - s/p trach replacement pt c/o difficulty speaking and needing to stop mid-sentence, ENT reconsulted 2/3, switched trach to larger size.     GI  #Nutrition/Fluids/Electrolytes   - replete K<4 and Mg <2  - Diet: regular   - IVF: none indicated at this time    Renal  - trend BMP    Infectious Disease  - Stroke Code CXR results:  No evidence of acute cardiopulmonary disease. Incidentally seen is a small opacification with some retraction in the right upper lobe, likely mucoid impaction. Further evaluation on a nonemergent basis with chest CT may be pursued if clinically warranted.    Endocrine  - A1C results: 5.0  - TSH results: 3.970    DVT Prophylaxis  - lovenox sq for DVT prophylaxis   - SCDs for DVT prophylaxis     Dispo: outpatient OT/visual therapy, no needs for PT, [place]     Discussed daily hospital plans and goals with patient at bedside.    Discussed with Neurology attending Dr. Ramírez

## 2023-02-03 NOTE — DIETITIAN INITIAL EVALUATION ADULT - PERTINENT MEDS FT
MEDICATIONS  (STANDING):  artificial  tears Solution 1 Drop(s) Both EYES two times a day  enoxaparin Injectable 40 milliGRAM(s) SubCutaneous every 24 hours  influenza  Vaccine (HIGH DOSE) 0.7 milliLiter(s) IntraMuscular once    MEDICATIONS  (PRN):

## 2023-02-03 NOTE — PROGRESS NOTE ADULT - SUBJECTIVE AND OBJECTIVE BOX
Neurology Stroke Progress Note    INTERVAL HPI/OVERNIGHT EVENTS:  No acute overnight events. Patient seen and examined. ENT consulted for trach management. Pt c/o difficulty speaking and needing to stop mid sentence.     MEDICATIONS  (STANDING):  aspirin enteric coated 81 milliGRAM(s) Oral daily  atorvastatin 80 milliGRAM(s) Oral at bedtime  clopidogrel Tablet 75 milliGRAM(s) Oral daily  enoxaparin Injectable 40 milliGRAM(s) SubCutaneous every 24 hours  influenza  Vaccine (HIGH DOSE) 0.7 milliLiter(s) IntraMuscular once    MEDICATIONS  (PRN):      Allergies    No Known Allergies    Intolerances          Vital Signs Last 24 Hrs  T(C): 36.8 (03 Feb 2023 05:05), Max: 36.8 (03 Feb 2023 05:05)  T(F): 98.2 (03 Feb 2023 05:05), Max: 98.2 (03 Feb 2023 05:05)  HR: 68 (03 Feb 2023 04:27) (68 - 81)  BP: 113/71 (03 Feb 2023 04:27) (94/57 - 132/82)  BP(mean): 87 (03 Feb 2023 04:27) (68 - 102)  RR: 17 (03 Feb 2023 04:27) (14 - 18)  SpO2: 98% (03 Feb 2023 04:27) (97% - 100%)    Parameters below as of 03 Feb 2023 04:27  Patient On (Oxygen Delivery Method): room air        Physical exam:  General: No acute distress, awake and alert  Eyes: Anicteric sclerae, moist conjunctivae, see below for CNs  Neck: trachea midline  Cardiovascular: Regular rate and rhythm  Pulmonary: Trach in place. No use of accessory muscles    Neurologic:  -Mental status: Awake, alert, oriented to person, place, and time. Speech is fluent with intact naming, repetition, and comprehension, no dysarthria. Recent and remote memory intact. Follows commands. Attention/concentration intact. Fund of knowledge appropriate.  -Cranial nerves:   II: Impaired vision in right eye 2/2 cataracts.  III, IV, VI: Extraocular movements are intact without nystagmus. Pupils equally round and reactive to light  V:  Facial sensation V1-V3 equal and intact   VII: Face is symmetric with normal eye closure and smile  VIII: Hearing is grossly intact   XII: Tongue protrudes midline  Motor: Normal bulk and tone. No pronator drift. Strength bilateral upper extremity 5/5, bilateral lower extremities 5/5.  Rapid alternating movements intact and symmetric  Sensation: Intact to light touch bilaterally. No neglect or extinction on double simultaneous testing.  Coordination: No dysmetria on finger-to-nose and heel-to-shin bilaterally    LABS:                        11.1   6.95  )-----------( 246      ( 02 Feb 2023 05:52 )             34.0     02-02    138  |  101  |  21  ----------------------------<  98  3.9   |  27  |  1.21    Ca    9.7      02 Feb 2023 05:52  Phos  3.3     02-02  Mg     1.8     02-02      RADIOLOGY & ADDITIONAL TESTS:     Neurology Stroke Progress Note    INTERVAL HPI/OVERNIGHT EVENTS:  No acute overnight events. Patient seen and examined. ENT consulted for trach management. Pt c/o difficulty speaking and needing to stop mid sentence. S/p trach replacement by ENT today, pt states effort to speak is improving. C/o dry throat, offered lozenges. Denies pain.     MEDICATIONS  (STANDING):  aspirin enteric coated 81 milliGRAM(s) Oral daily  atorvastatin 80 milliGRAM(s) Oral at bedtime  clopidogrel Tablet 75 milliGRAM(s) Oral daily  enoxaparin Injectable 40 milliGRAM(s) SubCutaneous every 24 hours  influenza  Vaccine (HIGH DOSE) 0.7 milliLiter(s) IntraMuscular once    MEDICATIONS  (PRN):      Allergies    No Known Allergies    Intolerances          Vital Signs Last 24 Hrs  T(C): 36.8 (03 Feb 2023 05:05), Max: 36.8 (03 Feb 2023 05:05)  T(F): 98.2 (03 Feb 2023 05:05), Max: 98.2 (03 Feb 2023 05:05)  HR: 68 (03 Feb 2023 04:27) (68 - 81)  BP: 113/71 (03 Feb 2023 04:27) (94/57 - 132/82)  BP(mean): 87 (03 Feb 2023 04:27) (68 - 102)  RR: 17 (03 Feb 2023 04:27) (14 - 18)  SpO2: 98% (03 Feb 2023 04:27) (97% - 100%)    Parameters below as of 03 Feb 2023 04:27  Patient On (Oxygen Delivery Method): room air        Physical exam:  General: No acute distress, awake and alert  Eyes: Anicteric sclerae, moist conjunctivae, see below for CNs  Neck: trachea midline  Cardiovascular: Regular rate and rhythm  Pulmonary: Trach in place. No use of accessory muscles    Neurologic:  -Mental status: Awake, alert, oriented to person, place, and time. Speech is fluent with intact naming, repetition, and comprehension, no dysarthria. Recent and remote memory intact. Follows commands. Attention/concentration intact. Fund of knowledge appropriate.  -Cranial nerves:   II: Impaired vision in right eye 2/2 cataracts.  III, IV, VI: Extraocular movements are intact without nystagmus. Pupils equally round and reactive to light  V:  Facial sensation V1-V3 equal and intact   VII: Face is symmetric with normal eye closure and smile  VIII: Hearing is grossly intact   XII: Tongue protrudes midline  Motor: Normal bulk and tone. No pronator drift. Strength bilateral upper extremity 5/5, bilateral lower extremities 5/5.  Rapid alternating movements intact and symmetric  Sensation: Intact to light touch bilaterally. No neglect or extinction on double simultaneous testing.  Coordination: No dysmetria on finger-to-nose and heel-to-shin bilaterally    LABS:                        11.1   6.95  )-----------( 246      ( 02 Feb 2023 05:52 )             34.0     02-02    138  |  101  |  21  ----------------------------<  98  3.9   |  27  |  1.21    Ca    9.7      02 Feb 2023 05:52  Phos  3.3     02-02  Mg     1.8     02-02      RADIOLOGY & ADDITIONAL TESTS:

## 2023-02-03 NOTE — DIETITIAN INITIAL EVALUATION ADULT - ADD RECOMMEND
1. Continue with current diet order; consistency per SLP (monitor need for ONS) 2. Encourage pt to meet nutritional needs as able 3. RD to obtain subjective information and provide diet ed as able 4. Pain and bowel regimen per team 5. Will assess/honor preferences as able 6. Malnutrition notice placed

## 2023-02-03 NOTE — DIETITIAN NUTRITION RISK NOTIFICATION - TREATMENT: THE FOLLOWING DIET HAS BEEN RECOMMENDED
Diet, Easy to Chew:   DASH/TLC {Sodium & Cholesterol Restricted} (DASH) (02-01-23 @ 09:52) [Active]

## 2023-02-03 NOTE — PROGRESS NOTE ADULT - NS ATTEND AMEND GEN_ALL_CORE FT
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  CT, CTA were unrevealing.  MRI brain and orbit w/o acute findings. TTE unremarkable. Ophthalmology feels visual loss is secondary to cataract; no evidence of CRAO. Will d/c DAPT/statin. Will optimize trach prior to D/C.
The patient is a 67-year-old [place]-speaking male with a history of hypertension, laryngeal cancer status postresection, and prior tobacco use who was admitted with intermittent dizziness and right eye visual loss.  CT, CTA were unrevealing.  MRI brain and orbit w/o acute findings. TTE pending.  Will discuss case with ophthalmology who feels visual loss is secondary to cataract; no evidence of CRAO. Continue aspirin. Will optimize trach prior to D/C. Orthostatic BP check given symptoms occur with standing.

## 2023-02-03 NOTE — DIETITIAN INITIAL EVALUATION ADULT - PERTINENT LABORATORY DATA
02-03    134<L>  |  100  |  21  ----------------------------<  89  4.0   |  26  |  1.19    Ca    9.9      03 Feb 2023 12:58  Phos  3.3     02-03  Mg     1.8     02-03    A1C with Estimated Average Glucose Result: 5.0 % (02-01-23 @ 06:00)

## 2023-02-03 NOTE — PROGRESS NOTE ADULT - ASSESSMENT
67M Armenian speaker, former smoker, h/o throat CA s/p resection w tracheostomy w speaking valve in [facility] (2017), b/l cataracts, p/w dizziness, loss of vision in R eye since 1/31 and red flashes in L eye, admitted for CVA evaluation, also seen by ophthalmology, s/p trach change 2/2 by ENT - for Home w HPT    #CVA R/O - R vision loss, L sided flashes vs chronic floater  A1C 5.7, LDL 77, TSH 3.97   - Ophthalmology also evaluating - Recommending b/l cataract surgery   - MRI orbits wo acute process  TTE - nml LVEF   - on DAPT, atorva  #s/p tracheostomy - 2017 - s/p throat CA resection - uses speaking valve.     Recommend  Artificial tears both eyes   Would discuss w ENT regarding re-evaluating tracheostomy now s/p exchange. Following are noted -    - pt has lost speaking valve and now using finger to speak   - also reports increased effort and breathlessness when speaking. There is increased air leakage on examination    DISPO: Home w Home PT f/u ENT re-evaluation; dc final recs.   Follow-up will need to establish primary care.

## 2023-02-03 NOTE — DIETITIAN INITIAL EVALUATION ADULT - OTHER CALCULATIONS
Based on Standards of Care pt within % IBW thus actual body weight used for all calculations. Needs adjusted for advanced age and malnutrition.

## 2023-02-03 NOTE — DIETITIAN INITIAL EVALUATION ADULT - CALCULATED TO (G/KG)
pt awake and alert, no acute distress or discomfort, denies any pain at the moment, vital signs stable, no further emesis noted, abdomen is soft, non distended, xray preformed, awaiting results, family updated on plan of care, will continue to monitor and reassess pt awake and alert, vital signs stable, no acute distress, additional thyroid study drawn and walked to lab, pt approved for discharge by MD, discharge instructions reviewed with mother by resident 71.12 Pt laying on stretcher watching tv, side rails up, call bell in reach, mom bedside, awaiting lab results and for pt to pee, will continue to monitor

## 2023-02-04 VITALS — RESPIRATION RATE: 18 BRPM

## 2023-02-04 LAB
ANION GAP SERPL CALC-SCNC: 10 MMOL/L — SIGNIFICANT CHANGE UP (ref 5–17)
BUN SERPL-MCNC: 21 MG/DL — SIGNIFICANT CHANGE UP (ref 7–23)
CALCIUM SERPL-MCNC: 9.5 MG/DL — SIGNIFICANT CHANGE UP (ref 8.4–10.5)
CHLORIDE SERPL-SCNC: 98 MMOL/L — SIGNIFICANT CHANGE UP (ref 96–108)
CO2 SERPL-SCNC: 26 MMOL/L — SIGNIFICANT CHANGE UP (ref 22–31)
CREAT SERPL-MCNC: 1.21 MG/DL — SIGNIFICANT CHANGE UP (ref 0.5–1.3)
EGFR: 66 ML/MIN/1.73M2 — SIGNIFICANT CHANGE UP
GLUCOSE SERPL-MCNC: 92 MG/DL — SIGNIFICANT CHANGE UP (ref 70–99)
HCT VFR BLD CALC: 35.1 % — LOW (ref 39–50)
HGB BLD-MCNC: 11.6 G/DL — LOW (ref 13–17)
MAGNESIUM SERPL-MCNC: 1.8 MG/DL — SIGNIFICANT CHANGE UP (ref 1.6–2.6)
MCHC RBC-ENTMCNC: 31.6 PG — SIGNIFICANT CHANGE UP (ref 27–34)
MCHC RBC-ENTMCNC: 33 GM/DL — SIGNIFICANT CHANGE UP (ref 32–36)
MCV RBC AUTO: 95.6 FL — SIGNIFICANT CHANGE UP (ref 80–100)
NRBC # BLD: 0 /100 WBCS — SIGNIFICANT CHANGE UP (ref 0–0)
PHOSPHATE SERPL-MCNC: 3.1 MG/DL — SIGNIFICANT CHANGE UP (ref 2.5–4.5)
PLATELET # BLD AUTO: 214 K/UL — SIGNIFICANT CHANGE UP (ref 150–400)
POTASSIUM SERPL-MCNC: 4 MMOL/L — SIGNIFICANT CHANGE UP (ref 3.5–5.3)
POTASSIUM SERPL-SCNC: 4 MMOL/L — SIGNIFICANT CHANGE UP (ref 3.5–5.3)
RBC # BLD: 3.67 M/UL — LOW (ref 4.2–5.8)
RBC # FLD: 12.6 % — SIGNIFICANT CHANGE UP (ref 10.3–14.5)
SODIUM SERPL-SCNC: 134 MMOL/L — LOW (ref 135–145)
WBC # BLD: 7.19 K/UL — SIGNIFICANT CHANGE UP (ref 3.8–10.5)
WBC # FLD AUTO: 7.19 K/UL — SIGNIFICANT CHANGE UP (ref 3.8–10.5)

## 2023-02-04 PROCEDURE — 83036 HEMOGLOBIN GLYCOSYLATED A1C: CPT

## 2023-02-04 PROCEDURE — 84443 ASSAY THYROID STIM HORMONE: CPT

## 2023-02-04 PROCEDURE — 92610 EVALUATE SWALLOWING FUNCTION: CPT

## 2023-02-04 PROCEDURE — A9585: CPT

## 2023-02-04 PROCEDURE — 70496 CT ANGIOGRAPHY HEAD: CPT | Mod: MA

## 2023-02-04 PROCEDURE — 71045 X-RAY EXAM CHEST 1 VIEW: CPT

## 2023-02-04 PROCEDURE — 70543 MRI ORBT/FAC/NCK W/O &W/DYE: CPT

## 2023-02-04 PROCEDURE — 85027 COMPLETE CBC AUTOMATED: CPT

## 2023-02-04 PROCEDURE — 83735 ASSAY OF MAGNESIUM: CPT

## 2023-02-04 PROCEDURE — 97161 PT EVAL LOW COMPLEX 20 MIN: CPT

## 2023-02-04 PROCEDURE — 92597 ORAL SPEECH DEVICE EVAL: CPT

## 2023-02-04 PROCEDURE — 80061 LIPID PANEL: CPT

## 2023-02-04 PROCEDURE — 99291 CRITICAL CARE FIRST HOUR: CPT | Mod: 25

## 2023-02-04 PROCEDURE — 0042T: CPT | Mod: MA

## 2023-02-04 PROCEDURE — 85652 RBC SED RATE AUTOMATED: CPT

## 2023-02-04 PROCEDURE — 85025 COMPLETE CBC W/AUTO DIFF WBC: CPT

## 2023-02-04 PROCEDURE — 82962 GLUCOSE BLOOD TEST: CPT

## 2023-02-04 PROCEDURE — 85730 THROMBOPLASTIN TIME PARTIAL: CPT

## 2023-02-04 PROCEDURE — 73080 X-RAY EXAM OF ELBOW: CPT

## 2023-02-04 PROCEDURE — 86803 HEPATITIS C AB TEST: CPT

## 2023-02-04 PROCEDURE — 84100 ASSAY OF PHOSPHORUS: CPT

## 2023-02-04 PROCEDURE — 36415 COLL VENOUS BLD VENIPUNCTURE: CPT

## 2023-02-04 PROCEDURE — 80053 COMPREHEN METABOLIC PANEL: CPT

## 2023-02-04 PROCEDURE — 84484 ASSAY OF TROPONIN QUANT: CPT

## 2023-02-04 PROCEDURE — 80048 BASIC METABOLIC PNL TOTAL CA: CPT

## 2023-02-04 PROCEDURE — 70450 CT HEAD/BRAIN W/O DYE: CPT | Mod: MA

## 2023-02-04 PROCEDURE — 85610 PROTHROMBIN TIME: CPT

## 2023-02-04 PROCEDURE — 97165 OT EVAL LOW COMPLEX 30 MIN: CPT

## 2023-02-04 PROCEDURE — 70498 CT ANGIOGRAPHY NECK: CPT | Mod: MA

## 2023-02-04 PROCEDURE — 93306 TTE W/DOPPLER COMPLETE: CPT

## 2023-02-04 PROCEDURE — 87635 SARS-COV-2 COVID-19 AMP PRB: CPT

## 2023-02-04 RX ADMIN — Medication 1 DROP(S): at 05:56

## 2023-02-04 NOTE — DISCHARGE NOTE NURSING/CASE MANAGEMENT/SOCIAL WORK - NSDCPEFALRISK_GEN_ALL_CORE
For information on Fall & Injury Prevention, visit: [email or web address] OR  [email or web address] OR  https://www.cdc.gov/steadi/patient.html

## 2023-02-04 NOTE — DISCHARGE NOTE NURSING/CASE MANAGEMENT/SOCIAL WORK - PATIENT PORTAL LINK FT
You can access the FollowMyHealth Patient Portal offered by [facility] by registering at the following website: [email or web address]. By joining [employer]’s FollowMyHealth portal, you will also be able to view your health information using other applications (apps) compatible with our system.

## 2023-02-08 DIAGNOSIS — Z85.89 PERSONAL HISTORY OF MALIGNANT NEOPLASM OF OTHER ORGANS AND SYSTEMS: ICD-10-CM

## 2023-02-08 DIAGNOSIS — E78.5 HYPERLIPIDEMIA, UNSPECIFIED: ICD-10-CM

## 2023-02-08 DIAGNOSIS — Z93.0 TRACHEOSTOMY STATUS: ICD-10-CM

## 2023-02-08 DIAGNOSIS — Z98.42 CATARACT EXTRACTION STATUS, LEFT EYE: ICD-10-CM

## 2023-02-08 DIAGNOSIS — Z96.1 PRESENCE OF INTRAOCULAR LENS: ICD-10-CM

## 2023-02-08 DIAGNOSIS — Z87.891 PERSONAL HISTORY OF NICOTINE DEPENDENCE: ICD-10-CM

## 2023-02-08 DIAGNOSIS — I10 ESSENTIAL (PRIMARY) HYPERTENSION: ICD-10-CM

## 2023-02-08 DIAGNOSIS — H54.61 UNQUALIFIED VISUAL LOSS, RIGHT EYE, NORMAL VISION LEFT EYE: ICD-10-CM

## 2023-02-08 DIAGNOSIS — Z98.41 CATARACT EXTRACTION STATUS, RIGHT EYE: ICD-10-CM

## 2023-02-08 DIAGNOSIS — E44.1 MILD PROTEIN-CALORIE MALNUTRITION: ICD-10-CM

## 2023-02-08 DIAGNOSIS — H26.491 OTHER SECONDARY CATARACT, RIGHT EYE: ICD-10-CM

## 2025-03-03 NOTE — ED ADULT NURSE NOTE - LANGUAGE ASSISTANCE NEEDED
Well controlled on losartan 50 mg once daily, due for recheck 6 months  Orders:    Lipid Panel with Direct LDL reflex; Future    Comprehensive metabolic panel; Future    UA (URINE) with reflex to Scope; Future     Yes-Patient/Caregiver accepts free interpretation services...